# Patient Record
Sex: FEMALE | Race: WHITE | NOT HISPANIC OR LATINO
[De-identification: names, ages, dates, MRNs, and addresses within clinical notes are randomized per-mention and may not be internally consistent; named-entity substitution may affect disease eponyms.]

---

## 2019-07-12 PROBLEM — Z00.00 ENCOUNTER FOR PREVENTIVE HEALTH EXAMINATION: Status: ACTIVE | Noted: 2019-07-12

## 2019-08-02 ENCOUNTER — APPOINTMENT (OUTPATIENT)
Dept: NEUROSURGERY | Facility: CLINIC | Age: 54
End: 2019-08-02
Payer: COMMERCIAL

## 2019-08-02 VITALS
BODY MASS INDEX: 24.25 KG/M2 | WEIGHT: 160 LBS | DIASTOLIC BLOOD PRESSURE: 90 MMHG | HEIGHT: 68 IN | SYSTOLIC BLOOD PRESSURE: 146 MMHG

## 2019-08-02 DIAGNOSIS — M81.0 AGE-RELATED OSTEOPOROSIS W/OUT CURRENT PATHOLOGICAL FRACTURE: ICD-10-CM

## 2019-08-02 DIAGNOSIS — Z78.9 OTHER SPECIFIED HEALTH STATUS: ICD-10-CM

## 2019-08-02 DIAGNOSIS — R20.2 PARESTHESIA OF SKIN: ICD-10-CM

## 2019-08-02 PROCEDURE — 99204 OFFICE O/P NEW MOD 45 MIN: CPT

## 2019-08-02 RX ORDER — FLUOCINONIDE 1 MG/G
0.1 CREAM TOPICAL
Qty: 240 | Refills: 0 | Status: ACTIVE | COMMUNITY
Start: 2019-02-11

## 2019-08-02 RX ORDER — VALACYCLOVIR 500 MG/1
500 TABLET, FILM COATED ORAL
Qty: 7 | Refills: 0 | Status: ACTIVE | COMMUNITY
Start: 2019-04-24

## 2019-08-02 RX ORDER — SULFAMETHOXAZOLE AND TRIMETHOPRIM 800; 160 MG/1; MG/1
800-160 TABLET ORAL
Qty: 14 | Refills: 0 | Status: ACTIVE | COMMUNITY
Start: 2019-06-13

## 2019-08-02 RX ORDER — MIRABEGRON 25 MG/1
25 TABLET, FILM COATED, EXTENDED RELEASE ORAL
Qty: 30 | Refills: 0 | Status: ACTIVE | COMMUNITY
Start: 2019-06-06

## 2019-08-02 RX ORDER — DOXEPIN HYDROCHLORIDE 25 MG/1
25 CAPSULE ORAL
Qty: 60 | Refills: 0 | Status: ACTIVE | COMMUNITY
Start: 2019-03-06

## 2019-08-02 RX ORDER — FENOPROFEN CALCIUM 200 MG/1
200 CAPSULE ORAL
Qty: 90 | Refills: 0 | Status: ACTIVE | COMMUNITY
Start: 2019-02-11

## 2019-08-02 RX ORDER — OXYBUTYNIN CHLORIDE 10 MG/1
10 TABLET, EXTENDED RELEASE ORAL
Qty: 30 | Refills: 0 | Status: ACTIVE | COMMUNITY
Start: 2019-05-07

## 2019-08-02 RX ORDER — METOPROLOL SUCCINATE 50 MG/1
50 TABLET, EXTENDED RELEASE ORAL
Qty: 30 | Refills: 0 | Status: ACTIVE | COMMUNITY
Start: 2019-02-25

## 2019-08-02 RX ORDER — FLUCONAZOLE 150 MG/1
150 TABLET ORAL
Qty: 1 | Refills: 0 | Status: ACTIVE | COMMUNITY
Start: 2019-06-06

## 2019-08-02 RX ORDER — CEFDINIR 300 MG/1
300 CAPSULE ORAL
Qty: 14 | Refills: 0 | Status: ACTIVE | COMMUNITY
Start: 2019-07-15

## 2019-08-02 RX ORDER — LEVOTHYROXINE SODIUM 0.05 MG/1
50 TABLET ORAL
Qty: 30 | Refills: 0 | Status: ACTIVE | COMMUNITY
Start: 2019-04-08

## 2019-08-02 RX ORDER — SIMVASTATIN 20 MG/1
20 TABLET, FILM COATED ORAL
Qty: 90 | Refills: 0 | Status: ACTIVE | COMMUNITY
Start: 2019-04-24

## 2019-08-02 RX ORDER — HYDROXYZINE PAMOATE 25 MG/1
25 CAPSULE ORAL
Qty: 30 | Refills: 0 | Status: ACTIVE | COMMUNITY
Start: 2019-02-22

## 2019-08-02 RX ORDER — BUPROPION HYDROCHLORIDE 300 MG/1
300 TABLET, EXTENDED RELEASE ORAL
Qty: 30 | Refills: 0 | Status: ACTIVE | COMMUNITY
Start: 2019-02-12

## 2019-08-02 RX ORDER — BUPROPION HYDROCHLORIDE 150 MG/1
150 TABLET, EXTENDED RELEASE ORAL
Qty: 30 | Refills: 0 | Status: ACTIVE | COMMUNITY
Start: 2019-02-11

## 2019-08-02 RX ORDER — CHLORZOXAZONE 250 MG/1
250 TABLET ORAL
Qty: 120 | Refills: 0 | Status: ACTIVE | COMMUNITY
Start: 2019-02-11

## 2019-08-02 NOTE — PHYSICAL EXAM
[General Appearance - In No Acute Distress] : in no acute distress [General Appearance - Alert] : alert [General Appearance - Well Developed] : well developed [General Appearance - Well Nourished] : well nourished [] : normal voice and communication [General Appearance - Well-Appearing] : healthy appearing [Person] : oriented to person [Place] : oriented to place [Time] : oriented to time [Cranial Nerves Optic (II)] : visual acuity intact bilaterally,  pupils equal round and reactive to light [Cranial Nerves Oculomotor (III)] : extraocular motion intact [Cranial Nerves Facial (VII)] : face symmetrical [Cranial Nerves Vestibulocochlear (VIII)] : hearing was intact bilaterally [Cranial Nerves Trigeminal (V)] : facial sensation intact symmetrically [Cranial Nerves Accessory (XI - Cranial And Spinal)] : head turning and shoulder shrug symmetric [Cranial Nerves Glossopharyngeal (IX)] : tongue and palate midline [Motor Tone] : muscle tone was normal in all four extremities [Cranial Nerves Hypoglossal (XII)] : there was no tongue deviation with protrusion [Involuntary Movements] : no involuntary movements were seen [Motor Strength] : muscle strength was normal in all four extremities [Motor Handedness Right-Handed] : the patient is right hand dominant [No Muscle Atrophy] : normal bulk in all four extremities [Sensation Vibration Decrease] : vibration was intact [Sensation Tactile Decrease] : light touch was intact [Abnormal Walk] : normal gait [Proprioception] : proprioception was intact [Balance] : balance was intact [3+] : Brachioradialis left 3+ [Normal] : normal [Pain / Temp Decrease - Root / Radicular Distribution] : C8 sensory impairment [4] : 4/5 Elbow Extensor (C7) [5] : 5/5 Small Finger Abduction (T1) [3] : 3/4 Brachioradialis Reflex [FreeTextEntry1] : well appearing female [Soto] : Soto's sign was not demonstrated [FreeTextEntry6] : +trace weakness with triceps on the left

## 2019-08-02 NOTE — PLAN
[FreeTextEntry1] : DIAGNOSIS:  CERVICAL HERNIATED DISC, C3-4\par TREATMENT PLAN:  NON-SURGICAL\par \par This is a patient with a left paracentral disc herniation at C3-4 resulting in mild to moderate left foraminal stenosis. There is an anterior fusion at C4-5 and C5-6 with hardware intact. There is no evidence of hardware failure. There is no evidence of severe central canal stenosis or cord compression on MRI and she is neurologically intact. She has no symptoms of myelopathy. I have recommended nonsurgical management at this time. This consists of physical therapy and/or manual medicine in conjunction to medical therapy and other conservative methods.  These include the consideration of trigger point injections and or the utilization of modalities such as TENS where applicable.  The next tier would be the referral to a pain management specialist (anesthesia or physiatry) for the consideration of spinal injections.  This includes the options of epidural steroids, facet injections as well as other novel techniques that may provide pain relief.  Although there is indeed evidence of disk degeneration on imaging, discectomy or spinal fusion for the sole purposes of treating neck pain in the absence of a compressive lesion or neurological issue is associated with poor outcomes.   \par \par I have reviewed the images in detail with the patient today in my office and have answered all questions regarding this condition to the best of my ability to the patient’s satisfaction.

## 2019-08-02 NOTE — HISTORY OF PRESENT ILLNESS
[FreeTextEntry1] : Neck and left arm pain  [de-identified] : Mrs. Aguilar is a pleasant 53 year old female who presents to the office in neurosurgical consultation today with complaints of worsening neck and left arm pain for the past three months. She has a history of an anterior cervical decompression and fusion C4-6 by Dr. Elizabeth in 2013. She has been experiencing paresthesias radiating down the left arm and into her left hand, particularly to her left fifth finger. She has also been experiencing numbness to her right hand. She is right hand dominant and denies loss of  strength and dexterity. Currently she rates her pain as 3/10 which is exacerbated with keeping her head up. She has been taking Motrin/Tylenol with some relief. She recently saw Dr. Styles who had prescribed her a Medrol dosepak which seemed to alleviate some of her symptoms. She is also taking Flexeril as needed with moderate relief. She denies any recent injuries.

## 2019-08-02 NOTE — CONSULT LETTER
[Dear  ___] : Dear  [unfilled], [Consult Letter:] : I had the pleasure of evaluating your patient, [unfilled]. [Sincerely,] : Sincerely, [Consult Closing:] : Thank you very much for allowing me to participate in the care of this patient.  If you have any questions, please do not hesitate to contact me. [FreeTextEntry1] : Mrs. Aguilar is a pleasant 53 year old female who presents to the office in neurosurgical consultation today with complaints of worsening neck and left arm pain for the past three months. She has a history of an anterior cervical decompression and fusion C4-6 by Dr. Elizabeth in 2013. She has been experiencing paresthesias radiating down the left arm and into her left hand, particularly to her left fifth finger. She has also been experiencing numbness to her right hand. She is right hand dominant and denies loss of  strength and dexterity. Currently she rates her pain as 3/10 which is exacerbated with keeping her head up. She has been taking Motrin/Tylenol with some relief. She recently saw Dr. Styles who had prescribed her a Medrol dosepak which seemed to alleviate some of her symptoms. She is also taking Flexeril as needed with moderate relief. She denies any recent injuries. \par \par This is a pleasant 53 year old female who presents to the office today with complaints of neck pain and a radiculopathy. However her symptoms seem to be consistent with a C8 radiculopathy, but MRI does not reveal a significant disc herniation at C6-7 or C7-T1 that would correspond with her symptoms. At this point there is no role for any surgical intervention. I did recommend conservative management with antiinflammatories and, if she would like, PT. If her symptoms persist then she may need an EMG for further evaluation as it may be more of a peripheral neuropathy. \par \par DIAGNOSIS:  CERVICAL HERNIATED DISC, C3-4\par TREATMENT PLAN:  NON-SURGICAL\par \par This is a patient with a left paracentral disc herniation at C3-4 resulting in mild to moderate left foraminal stenosis. There is an anterior fusion at C4-5 and C5-6 with hardware intact. There is no evidence of hardware failure. There is no evidence of severe central canal stenosis or cord compression on MRI and she is neurologically intact. She has no symptoms of myelopathy. I have recommended nonsurgical management at this time. This consists of physical therapy and/or manual medicine in conjunction to medical therapy and other conservative methods.  These include the consideration of trigger point injections and or the utilization of modalities such as TENS where applicable.  The next tier would be the referral to a pain management specialist (anesthesia or physiatry) for the consideration of spinal injections.  This includes the options of epidural steroids, facet injections as well as other novel techniques that may provide pain relief.  Although there is indeed evidence of disk degeneration on imaging, discectomy or spinal fusion for the sole purposes of treating neck pain in the absence of a compressive lesion or neurological issue is associated with poor outcomes.   \par  [FreeTextEntry3] : Jaimie Hendrickson [email or web address]

## 2019-08-02 NOTE — PHYSICAL EXAM
[General Appearance - In No Acute Distress] : in no acute distress [General Appearance - Alert] : alert [General Appearance - Well Developed] : well developed [General Appearance - Well Nourished] : well nourished [General Appearance - Well-Appearing] : healthy appearing [] : normal voice and communication [Place] : oriented to place [Person] : oriented to person [Time] : oriented to time [Cranial Nerves Optic (II)] : visual acuity intact bilaterally,  pupils equal round and reactive to light [Cranial Nerves Oculomotor (III)] : extraocular motion intact [Cranial Nerves Facial (VII)] : face symmetrical [Cranial Nerves Trigeminal (V)] : facial sensation intact symmetrically [Cranial Nerves Vestibulocochlear (VIII)] : hearing was intact bilaterally [Cranial Nerves Accessory (XI - Cranial And Spinal)] : head turning and shoulder shrug symmetric [Cranial Nerves Glossopharyngeal (IX)] : tongue and palate midline [Motor Tone] : muscle tone was normal in all four extremities [Cranial Nerves Hypoglossal (XII)] : there was no tongue deviation with protrusion [Involuntary Movements] : no involuntary movements were seen [Motor Strength] : muscle strength was normal in all four extremities [Motor Handedness Right-Handed] : the patient is right hand dominant [No Muscle Atrophy] : normal bulk in all four extremities [Sensation Vibration Decrease] : vibration was intact [Sensation Tactile Decrease] : light touch was intact [Abnormal Walk] : normal gait [Proprioception] : proprioception was intact [Balance] : balance was intact [3+] : Brachioradialis left 3+ [Normal] : normal [Pain / Temp Decrease - Root / Radicular Distribution] : C8 sensory impairment [4] : 4/5 Elbow Extensor (C7) [5] : 5/5 Small Finger Abduction (T1) [3] : 3/4 Brachioradialis Reflex [FreeTextEntry1] : well appearing female [Soto] : Soto's sign was not demonstrated [FreeTextEntry6] : +trace weakness with triceps on the left

## 2019-08-02 NOTE — REASON FOR VISIT
[Spouse] : spouse [Referred By: _________] : Patient was referred by CRISTOBAL [New Patient Visit] : a new patient visit [FreeTextEntry1] : Neck and Left arm Pain, previous ACDF C4-6

## 2019-08-02 NOTE — RESULTS/DATA
[FreeTextEntry1] : MRI ([place] 5/2019) cervical spine was personally reviewed with the patients in the office today. MRI reveals anterior cervical hardware in place C4-5 and C5-6. There is a left paracentral disc herniation at C3-4 resulting in mild to moderate left foraminal stenosis. At C5-6 there is a small left paracentral disc bulge resulting in mild left foraminal stenosis. At C6-7 there is a small broad based disc bulge.

## 2019-08-02 NOTE — REASON FOR VISIT
[Spouse] : spouse [New Patient Visit] : a new patient visit [Referred By: _________] : Patient was referred by CRISTOBAL [FreeTextEntry1] : Neck and Left arm Pain, previous ACDF C4-6

## 2019-08-02 NOTE — REVIEW OF SYSTEMS
[As Noted in HPI] : as noted in HPI [Numbness] : numbness [Tingling] : tingling [Negative] : Constitutional

## 2019-08-02 NOTE — RESULTS/DATA
[FreeTextEntry1] : MRI ([place] 5/2019) cervical spine was personally reviewed with the patients in the office today. MRI reveals anterior cervical hardware in place C4-5 and C5-6. There is a left paracentral disc herniation at C3-4 resulting in mild to moderate left foraminal stenosis. At C5-6 there is a small left paracentral disc bulge resulting in mild left foraminal stenosis. At C6-7 there is a small broad based disc bulge.

## 2019-08-02 NOTE — HISTORY OF PRESENT ILLNESS
[FreeTextEntry1] : Neck and left arm pain  [de-identified] : Mrs. Aguilar is a pleasant 53 year old female who presents to the office in neurosurgical consultation today with complaints of worsening neck and left arm pain for the past three months. She has a history of an anterior cervical decompression and fusion C4-6 by Dr. Elizabeth in 2013. She has been experiencing paresthesias radiating down the left arm and into her left hand, particularly to her left fifth finger. She has also been experiencing numbness to her right hand. She is right hand dominant and denies loss of  strength and dexterity. Currently she rates her pain as 3/10 which is exacerbated with keeping her head up. She has been taking Motrin/Tylenol with some relief. She recently saw Dr. Styles who had prescribed her a Medrol dosepak which seemed to alleviate some of her symptoms. She is also taking Flexeril as needed with moderate relief. She denies any recent injuries.

## 2019-08-02 NOTE — ASSESSMENT
[FreeTextEntry1] : This is a pleasant 53 year old female who presents to the office today with complaints of neck pain and a radiculopathy. However her symptoms seem to be consistent with a C8 radiculopathy, but MRI does not reveal a significant disc herniation at C6-7 or C7-T1 that would correspond with her symptoms. At this point there is no role for any surgical intervention. I did recommend conservative management with antiinflammatories and, if she would like, PT. If her symptoms persist then she may need an EMG for further evaluation as it may be more of a peripheral neuropathy.

## 2021-03-30 ENCOUNTER — APPOINTMENT (OUTPATIENT)
Dept: NEUROSURGERY | Facility: CLINIC | Age: 56
End: 2021-03-30
Payer: COMMERCIAL

## 2021-03-30 VITALS
WEIGHT: 190 LBS | DIASTOLIC BLOOD PRESSURE: 86 MMHG | HEART RATE: 111 BPM | BODY MASS INDEX: 29.47 KG/M2 | HEIGHT: 67.5 IN | TEMPERATURE: 98.4 F | SYSTOLIC BLOOD PRESSURE: 140 MMHG

## 2021-03-30 DIAGNOSIS — F17.210 NICOTINE DEPENDENCE, CIGARETTES, UNCOMPLICATED: ICD-10-CM

## 2021-03-30 PROCEDURE — 99072 ADDL SUPL MATRL&STAF TM PHE: CPT

## 2021-03-30 PROCEDURE — 99213 OFFICE O/P EST LOW 20 MIN: CPT

## 2021-03-30 NOTE — REASON FOR VISIT
[Follow-Up: _____] : a [unfilled] follow-up visit [FreeTextEntry1] : Neck pain- Never continued conservative measures

## 2021-03-30 NOTE — PHYSICAL EXAM
[General Appearance - Alert] : alert [General Appearance - In No Acute Distress] : in no acute distress [General Appearance - Well Nourished] : well nourished [General Appearance - Well Developed] : well developed [General Appearance - Well-Appearing] : healthy appearing [] : normal voice and communication [Person] : oriented to person [Place] : oriented to place [Time] : oriented to time [Cranial Nerves Optic (II)] : visual acuity intact bilaterally,  pupils equal round and reactive to light [Cranial Nerves Oculomotor (III)] : extraocular motion intact [Cranial Nerves Trigeminal (V)] : facial sensation intact symmetrically [Cranial Nerves Facial (VII)] : face symmetrical [Cranial Nerves Vestibulocochlear (VIII)] : hearing was intact bilaterally [Cranial Nerves Glossopharyngeal (IX)] : tongue and palate midline [Cranial Nerves Accessory (XI - Cranial And Spinal)] : head turning and shoulder shrug symmetric [Cranial Nerves Hypoglossal (XII)] : there was no tongue deviation with protrusion [Motor Strength] : muscle strength was normal in all four extremities [Motor Tone] : muscle tone was normal in all four extremities [Involuntary Movements] : no involuntary movements were seen [No Muscle Atrophy] : normal bulk in all four extremities [Motor Handedness Right-Handed] : the patient is right hand dominant [Sensation Tactile Decrease] : light touch was intact [Sensation Vibration Decrease] : vibration was intact [Proprioception] : proprioception was intact [Abnormal Walk] : normal gait [Balance] : balance was intact [Normal] : normal [Pain / Temp Decrease - Root / Radicular Distribution] : C8 sensory impairment [4] : 4/5 Elbow Extensor (C7) [5] : 5/5 Small Finger Abduction (T1) [3] : 3/4 Brachioradialis Reflex [FreeTextEntry1] : well appearing female [Soto] : Soto's sign was not demonstrated [FreeTextEntry6] : +trace weakness with triceps on the left

## 2021-03-30 NOTE — ASSESSMENT
[FreeTextEntry1] : Discussed the history, physical examination findings,  previous surgery and recent imaging with the patient with all questions answered.  Because of the report of worsening upper extremity radicular symptoms, it is necessary to obtain updated imaging of the cervical spine.  An MRI of the cervical spine has been ordered today.  Recommend rest and  modified activity.  Medications as tolerated.  The patient will follow up after the imaging studies are completed to discuss further treatment recommendations.  Patient reports that she is not interested in another operation.  Discussed that we will most likely initiate conservative treatment and discuss all treatment options available.

## 2021-03-30 NOTE — DATA REVIEWED
[de-identified] : Were reviewed of the cervical spine -3/30/2021: Intact hardware with good anatomic alignment status post ACDF C4-6

## 2021-03-30 NOTE — HISTORY OF PRESENT ILLNESS
[FreeTextEntry1] : 55-year-old female presents to the neurosurgery office for follow-up evaluation.  The patient was last seen in 2019 for similar complaints.  The patient is known to the office that she is previously undergone an ACDF C4-6 by Dr. Elizabeth in 2013.  At the last visit, the patient reported neck pain and radiating left arm pain with some associated numbness and tingling.  Conservative treatment was recommended at that time.  The patient works as a  and has noticed worsening symptoms with clumsiness of the hands and she reports an unsteadiness in her gait.  No recent injury or trauma reported.

## 2021-04-06 ENCOUNTER — APPOINTMENT (OUTPATIENT)
Dept: NEUROSURGERY | Facility: CLINIC | Age: 56
End: 2021-04-06
Payer: COMMERCIAL

## 2021-04-06 VITALS
SYSTOLIC BLOOD PRESSURE: 139 MMHG | WEIGHT: 190 LBS | HEIGHT: 67.5 IN | HEART RATE: 87 BPM | DIASTOLIC BLOOD PRESSURE: 84 MMHG | TEMPERATURE: 98.1 F | BODY MASS INDEX: 29.47 KG/M2

## 2021-04-06 PROCEDURE — 99072 ADDL SUPL MATRL&STAF TM PHE: CPT

## 2021-04-06 PROCEDURE — 99213 OFFICE O/P EST LOW 20 MIN: CPT

## 2021-04-06 NOTE — PHYSICAL EXAM
[Motor Handedness Right-Handed] : the patient is right hand dominant [2+] : Triceps right 2+ [3+] : Triceps left 3+ [Normal] : normal [Able to toe walk] : the patient was able to toe walk [Able to heel walk] : the patient was able to heel walk [Intact] : all sensory within normal limits bilaterally [Soto] : Soto's sign was not demonstrated

## 2021-04-06 NOTE — RESULTS/DATA
[FreeTextEntry1] : \chung Cotter MRIs were reviewed. There is a 2 level cervical decompression fusion and is well fused seen on CAT scan 2019. A 2021 MRI shows the area of the discectomy was completely decompressed. The level above at C3-4 has some adjacent degeneration with stenosis. It is notable as the C1-C2 area does have significant stenosis as well. When compared to studies there is a clear abnormality DOC junction C1-C2. The narrowing may be somewhat more currently the most seen in the past. However there no axial cuts through this area appear

## 2021-04-06 NOTE — ASSESSMENT
[FreeTextEntry1] : \par As the patient is status post anterior cervical decompression fusion. The area of the cervical surgery was completely decompressed and fused. There is a small amount adjacent segment degeneration at C3-4 which potentially could be causing some of her symptomatology however more of concern is the OC and C1-C2 junction where there is more significant stenosis.\par \par I recommended a repeat MRI to look at this very specifically as it is not imaged properly on the routine cervical study and I recommended a CAT scan as well. His wound but to determine the nation whether or not she needs a simple anterior cervical discectomy and fusion C3-4 was something more complicated such as occipital cervical fusion or C1-C2 fusion.\par \par

## 2021-04-06 NOTE — REASON FOR VISIT
[Follow-Up: _____] : a [unfilled] follow-up visit [FreeTextEntry1] : \chung Allen is a 55-year-old here for evaluation of her cervical spine. I had taken care of her in the distant past she had a two-level anterior cervical discectomy with fusion C4-6. She can follow thereafter with CAT scans and MRI. In addition to her cervical stenosis she has a congenital anomaly of C1-C2. She is now complaining of increasing signs and symptoms that are suggestive of myelopathy. She has some imbalance clumsiness of her hands numbness and tingling. MRI studies were performed [place] recently. These are compared to studies done as far back as 2013.

## 2021-04-30 ENCOUNTER — APPOINTMENT (OUTPATIENT)
Dept: NEUROSURGERY | Facility: CLINIC | Age: 56
End: 2021-04-30
Payer: COMMERCIAL

## 2021-04-30 VITALS
HEIGHT: 67.5 IN | HEART RATE: 117 BPM | DIASTOLIC BLOOD PRESSURE: 70 MMHG | TEMPERATURE: 98.1 F | WEIGHT: 190 LBS | SYSTOLIC BLOOD PRESSURE: 130 MMHG | BODY MASS INDEX: 29.47 KG/M2

## 2021-04-30 DIAGNOSIS — M54.2 CERVICALGIA: ICD-10-CM

## 2021-04-30 PROCEDURE — 99072 ADDL SUPL MATRL&STAF TM PHE: CPT

## 2021-04-30 PROCEDURE — 99213 OFFICE O/P EST LOW 20 MIN: CPT

## 2021-04-30 NOTE — ASSESSMENT
[FreeTextEntry1] : \par Diagnosis:  s/p ACDF C4-7  mild ASD  C34 with C12 stenosis \par Treatment:  Nonsurgical\par \par This is a patient with cervical stenosis at C1-C2 due to a congenital anomaly.  Seems overall stable since imaging done in 2013.  She does have some balance issues and questions whether or not it is due to her spine or another doctor.  She states a history of vestibular issues.  Recommended the following: Neurology consultation, pain management consultation, and physical therapy with gait stabilization.  Not recommended any further surgery at this time.  Follow-up with her clinically on an as-needed basis.\par

## 2021-04-30 NOTE — REASON FOR VISIT
[Follow-Up: _____] : a [unfilled] follow-up visit [Spouse] : spouse [FreeTextEntry1] : Tiffany is here for a follow up with a new MRI that goes through C1/2\par She is still having some balance issues but is stable\par She has varied degrees of neck pain\par She has no evidence of overt myelopathy\par \par GONZALEZ Garcia\par PM  [place]\par \par \par

## 2021-04-30 NOTE — RESULTS/DATA
[FreeTextEntry1] : The full report is in the EMR  [facility] MRI was reviewed in detail.\par \par There is stenosis at C12 without myelomalacia. Visit congenital anomaly at C1-C2 with posterior osteophytic overgrowth buckling of the ligamentum flavum with some stenosis at this level.This is unchanged sicne 2013\par There is a C4-7 ACDF with mild ASD at C34\par

## 2021-10-19 ENCOUNTER — FORM ENCOUNTER (OUTPATIENT)
Age: 56
End: 2021-10-19

## 2021-10-20 ENCOUNTER — FORM ENCOUNTER (OUTPATIENT)
Age: 56
End: 2021-10-20

## 2021-10-21 ENCOUNTER — FORM ENCOUNTER (OUTPATIENT)
Age: 56
End: 2021-10-21

## 2021-11-03 ENCOUNTER — APPOINTMENT (OUTPATIENT)
Dept: INFECTIOUS DISEASE | Facility: CLINIC | Age: 56
End: 2021-11-03

## 2023-01-27 ENCOUNTER — APPOINTMENT (OUTPATIENT)
Dept: NEUROSURGERY | Facility: CLINIC | Age: 58
End: 2023-01-27
Payer: COMMERCIAL

## 2023-01-27 ENCOUNTER — APPOINTMENT (OUTPATIENT)
Dept: NEUROSURGERY | Facility: CLINIC | Age: 58
End: 2023-01-27

## 2023-01-27 PROCEDURE — 99441: CPT

## 2023-01-30 ENCOUNTER — APPOINTMENT (OUTPATIENT)
Dept: NEUROSURGERY | Facility: CLINIC | Age: 58
End: 2023-01-30
Payer: COMMERCIAL

## 2023-01-30 VITALS — WEIGHT: 190 LBS | TEMPERATURE: 98.1 F | BODY MASS INDEX: 29.82 KG/M2 | HEIGHT: 67 IN

## 2023-01-30 PROCEDURE — 99213 OFFICE O/P EST LOW 20 MIN: CPT

## 2023-01-30 NOTE — PHYSICAL EXAM
[Romberg's Sign] : Romberg's sign was negtive [Abnormal Walk] : normal gait [Balance] : balance was intact [FreeTextEntry6] : She has trace weakness of  in the left arm and hand she has normal deep tendon reflexes [FreeTextEntry7] : She has numbness and tingling down the entire left arm and hand.

## 2023-01-30 NOTE — ASSESSMENT
[FreeTextEntry1] : \par DIAGNOSIS    ADJACENT SEGMENT DEGENERATION C34  / PREVIOUS FUSION C4-6\par TREATMENT   NONSURGICAL VS.   EXPLORATION OF FUSION AND EXTENSION OF INSTRUMENTED FIXATION AND FUSION C34 \par \par This is a patient with adjacent segment degeneration to her previous cervical  fusion. At this point treatment options include bracing and nonsurgical methods. These include pain management techniques or the consideration of a spinal stimulator. There is moderately significant stenosis. The  option of absolute last resort would be to extend the fusion. This would entail exploring the previous surgical site assuring that the hardware was well seated and the previous fusion is robust. We would then proceed with  extending with the use of an interbody device  up to the affected levels.   \par \par I do not believe the C1-2 level requires treatment at this time and is stable.\par \par \par Risks and benefits of surgery were described to the patient in detail which include but not excluding those otherwise not mentioned:  coma paralysis, death, stroke, spinal fluid leak, nerve injury, weakness, infection, hardware malfunction/failure/mal-positioning requiring re-operation, vascular injury, nonunion/pseudoarthrosis, adjacent segment degeneration, dysphonia and dysphagia  or persistent pain.\par \par Because of her persistent symptomatology I recommended that she consider surgery upfront.  If her symptoms worsen I told her that it may become more of an urgent matter.

## 2023-01-30 NOTE — RESULTS/DATA
[FreeTextEntry1] : \par [facility] MRI was reviewed in detail showing that area of C3-4 adjacent segment degeneration with a disc osteophyte paracentral to the left.  Also noted was what I had noted previously at the C1-2 level some congenital stenosis that appears to be unchanged there is no evidence of additional stenosis or spinal cord signal change up at this level.
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
## 2023-02-08 ENCOUNTER — TRANSCRIPTION ENCOUNTER (OUTPATIENT)
Age: 58
End: 2023-02-08

## 2023-02-08 ENCOUNTER — APPOINTMENT (OUTPATIENT)
Dept: ORTHOPEDIC SURGERY | Facility: CLINIC | Age: 58
End: 2023-02-08
Payer: OTHER MISCELLANEOUS

## 2023-02-08 VITALS — WEIGHT: 190 LBS | BODY MASS INDEX: 29.82 KG/M2 | HEIGHT: 67 IN

## 2023-02-08 DIAGNOSIS — E78.00 PURE HYPERCHOLESTEROLEMIA, UNSPECIFIED: ICD-10-CM

## 2023-02-08 PROCEDURE — 99072 ADDL SUPL MATRL&STAF TM PHE: CPT

## 2023-02-08 PROCEDURE — 99204 OFFICE O/P NEW MOD 45 MIN: CPT

## 2023-02-08 NOTE — PHYSICAL EXAM
[4___] : left grasp 4[unfilled]/5 [Left] : left hand [de-identified] : Constitutional:\par - General Appearance:\par Unremarkable\par Body Habitus\par Well Developed\par Well Nourished\par Body Habitus\par No Deformities\par Well Groomed\par Ability To communicate:\par Normal\par Neurologic:\par Global sensation is intact to upper and lower extremities. See examination of Neck and/or Spine\par for exceptions.\par Orientation to Time, Place and Person is: Normal\par Mood And Affect is Normal\par Skin:\par - Head/Face, Right Upper/Lower Extremity, Left Upper/Lower Extremity: Normal\par See Examination of Neck and/or Spine for exceptions\par Cardiovascular:\par Peripheral Cardiovascular System is Normal\par Palpation of Lymph Nodes:\par Normal Palpation of lymph nodes in: Axilla, Cervical, Inguinal\par Abnormal Palpation of lymph nodes in: None  [] : negative facet loading [TWNoteComboBox7] : forward flexion 20 degrees [de-identified] : extension 0 degrees [de-identified] : left lateral rotation 25 degrees [TWNoteComboBox6] : right lateral rotation 45 degrees [de-identified] : shuffling gait  [de-identified] : 3+ b/l reflex

## 2023-02-08 NOTE — ASSESSMENT
[FreeTextEntry1] : 56 y/o female with cervical radiculopathy and myelopathy. Patient will obtain prior EMG results for next visit to review. I have indicated the patient for C3-4 ACDF. Risks, benefits and expected outcomes have been explained to the patient. Patient was understanding and in agreement. Patient remains temporarily and totally disabled from customary work at this time due to symptom severity. Patient will stay OOW. Note given. Patient will follow up in 1 month. \par \par Prior to appointment and during encounter with patient extensive medical records were reviewed including but not limited to, hospital records, outpatient records, imaging results, and lab data.During this appointment the patient was examined, diagnoses were discussed and explained in a face to face manner. In addition extensive time was spent reviewing aforementioned diagnostic studies. Counseling including abnormal image results, differential diagnoses, treatment options, risk and benefits, lifestyle changes, current condition, and current medications was performed. Patient's comments, questions, and concerns were addressed and patient verbalized understanding. Based on this patient's presentation at our office, which is an orthopedic spine surgeon's office, this patient inherently / intrinsically has a risk, however minute, of developing issues such as Cauda equina syndrome, bowel and bladder changes, or progression of motor or neurological deficits such as paralysis which may be permanent. \par \par I, Gloria Paul, attest that this documentation has been prepared under the direction and in the presence of provider Curtis Beltran MD.
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
## 2023-02-08 NOTE — DATA REVIEWED
[FreeTextEntry1] : On my interpretation of these images from [facility] on 11/23/22\par C2-3: normal \par C3-4: left sided disc herniation causing moderate central and moderate to severe foraminal stenosis with a mild left sided chord compression \par C4-5: S/p ACDF appears well decompressed \par C5-6: S/p ACDF appears well decompressed\par C6-7: normal\par C7-T1: normal \par T1-2: small disc herniation

## 2023-03-05 ENCOUNTER — FORM ENCOUNTER (OUTPATIENT)
Age: 58
End: 2023-03-05

## 2023-03-08 ENCOUNTER — APPOINTMENT (OUTPATIENT)
Dept: ORTHOPEDIC SURGERY | Facility: CLINIC | Age: 58
End: 2023-03-08
Payer: OTHER MISCELLANEOUS

## 2023-03-08 VITALS — BODY MASS INDEX: 29.82 KG/M2 | WEIGHT: 190 LBS | HEIGHT: 67 IN

## 2023-03-08 PROCEDURE — 99214 OFFICE O/P EST MOD 30 MIN: CPT

## 2023-03-08 PROCEDURE — 99072 ADDL SUPL MATRL&STAF TM PHE: CPT

## 2023-03-08 NOTE — DATA REVIEWED
[FreeTextEntry1] : EMG on 12/21/22 - left C4, C5, C6 cervical radiculopathy, no peripheral compression\par \par On my interpretation of these images from [facility] on 11/23/22\par C2-3: normal \par C3-4: left sided disc herniation causing moderate central and moderate to severe foraminal stenosis with a mild left sided chord compression \par C4-5: S/p ACDF appears well decompressed \par C5-6: S/p ACDF appears well decompressed\par C6-7: normal\par C7-T1: normal \par T1-2: small disc herniation
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
## 2023-03-08 NOTE — PHYSICAL EXAM
[4___] : left grasp 4[unfilled]/5 [Bilateral] : hand bilaterally [de-identified] : Constitutional:\par - General Appearance:\par Unremarkable\par Body Habitus\par Well Developed\par Well Nourished\par Body Habitus\par No Deformities\par Well Groomed\par Ability To communicate:\par Normal\par Neurologic:\par Global sensation is intact to upper and lower extremities. See examination of Neck and/or Spine\par for exceptions.\par Orientation to Time, Place and Person is: Normal\par Mood And Affect is Normal\par Skin:\par - Head/Face, Right Upper/Lower Extremity, Left Upper/Lower Extremity: Normal\par See Examination of Neck and/or Spine for exceptions\par Cardiovascular:\par Peripheral Cardiovascular System is Normal\par Palpation of Lymph Nodes:\par Normal Palpation of lymph nodes in: Axilla, Cervical, Inguinal\par Abnormal Palpation of lymph nodes in: None  [] : negative facet loading [de-identified] : pain with wrist flexors [TWNoteComboBox7] : forward flexion 20 degrees [de-identified] : extension 0 degrees [de-identified] : left lateral rotation 25 degrees [TWNoteComboBox6] : right lateral rotation 45 degrees [de-identified] : 3+ b/l reflex  [de-identified] : mild paresthesias non-radicular

## 2023-03-14 ENCOUNTER — APPOINTMENT (OUTPATIENT)
Dept: PAIN MANAGEMENT | Facility: CLINIC | Age: 58
End: 2023-03-14
Payer: OTHER MISCELLANEOUS

## 2023-03-14 VITALS — BODY MASS INDEX: 29.82 KG/M2 | HEIGHT: 67 IN | WEIGHT: 190 LBS

## 2023-03-14 PROCEDURE — 99072 ADDL SUPL MATRL&STAF TM PHE: CPT

## 2023-03-14 PROCEDURE — 99204 OFFICE O/P NEW MOD 45 MIN: CPT

## 2023-03-14 RX ORDER — METHYLPREDNISOLONE 4 MG/1
4 TABLET ORAL
Qty: 21 | Refills: 0 | Status: DISCONTINUED | COMMUNITY
Start: 2019-07-31 | End: 2023-03-14

## 2023-03-14 RX ORDER — CELECOXIB 200 MG/1
200 CAPSULE ORAL DAILY
Qty: 30 | Refills: 1 | Status: DISCONTINUED | COMMUNITY
Start: 2021-04-30 | End: 2023-03-14

## 2023-03-14 RX ORDER — CARISOPRODOL 250 MG/1
250 TABLET ORAL
Qty: 20 | Refills: 0 | Status: DISCONTINUED | COMMUNITY
Start: 2021-04-30 | End: 2023-03-14

## 2023-03-14 NOTE — PHYSICAL EXAM
[de-identified] : Constitutional:  \par - No acute distress  \par - Well developed; well nourished  \par \par Neurological:  \par - normal mood and affect  \par - alert and oriented x 3   \par \par Cardiovascular:  \par - grossly normal \par \par Cervical Spine Exam: \par \par Inspection:  \par erythema (-)  \par ecchymosis (-)  \par rashes (-)  \par \par Palpation:                                                   \par Cervical paraspinal tenderness:         R (-); L (-) \par Upper trapezius tenderness:              R (+); L (+) \par Rhomboids tenderness:                      R (-); L (-) \par Occipital Ridge:                                    R (-); L (-) \par Supraspinatus tenderness:                 R (-); L (-) \par \par ROM: Full ROM pain with extension and bilateral rotation\par \par Strength Testing:             \par Deltoid                           R (5/5); L (5/5) \par Biceps:                          R (5/5); L (5/5) \par Triceps:                         R (5/5); L (5/5) \par Finger Abductors:         R (5/5); L (5/5) \par Grasp:                           R (5/5); L (5/5) \par \par Special Testing: \par Spurling Test:                  R (-); L (+) \par Facet load test:               R (-); L (-) \par Soto Sign:                 R (+); L (+)\par \par Neuro: \par SILT throughout right upper extremity \par SILT throughout left upper extremity \par \par Reflexes: \par Biceps   -           R (3+); L (3+) \par Triceps  -           R (3+); L (3+) \par Brachioradialis- R (3+); L (3+)   \par Hyperreflexia in lower extremities bilaterally\par \par Positive Ankle clonus bilaterally
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
## 2023-03-14 NOTE — ASSESSMENT
[FreeTextEntry1] : A discussion regarding available pain management treatment options occurred with the patient.  These included interventional, rehabilitative, pharmacological, and alternative modalities. We will proceed with the following:  \par \par Interventional treatment options:  \par - Proceed with left PM C7-T1 CLIFFORD with fluoroscopic guidance\par - see additional instructions below  \par \par Rehabilitative options:  \par - completed PT trial\par - participation in active HEP was discussed  \par \par Medication based treatment options:\par - continue gabapentin 300 mg TID; further titration based upon clinical response\par - hydrocodone 5/300 up to BID PRN severe pain\par - see additional instructions below  \par \par Complementary treatment options:  \par - Weight management and lifestyle modifications discussed  \par - failed chiropractic care\par \par Additional treatment recommendations as follows:  \par - patient will follow up with Dr. Beltran as directed; patient awaiting authorization for ACDF\par - Follow up 1-2 weeks post injection for assessment of efficacy and further treatment recommendations\par - patient counseled extensively as to red flag signs and when to seek urgent care\par \par The risks, benefits and alternatives of the proposed procedure were explained in detail with the patient. The risks outlined include but are not limited to infection, bleeding, post- dural puncture headache, nerve injury, a temporary increase in pain, failure to resolve symptoms, allergic reaction, and possible elevation of blood sugar in diabetics if using corticosteroid.  All questions were answered to patient's apparent satisfaction and he/she verbalized an understanding.\par \par The documentation recorded by the scribe, in my presence, accurately reflects the service I personally performed and the decisions made by me with my edits as appropriate. \par \par I, Manjinder Esqueda acting as scribe, attest that this documentation has been prepared under the direction and in the presence of Provider Giacomo Hilliard DO.

## 2023-04-19 ENCOUNTER — APPOINTMENT (OUTPATIENT)
Dept: ORTHOPEDIC SURGERY | Facility: CLINIC | Age: 58
End: 2023-04-19
Payer: OTHER MISCELLANEOUS

## 2023-04-19 VITALS — BODY MASS INDEX: 29.82 KG/M2 | HEIGHT: 67 IN | WEIGHT: 190 LBS

## 2023-04-19 PROCEDURE — 99214 OFFICE O/P EST MOD 30 MIN: CPT
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
## 2023-04-24 NOTE — ASSESSMENT
[FreeTextEntry1] : 58 y/o female with cervical radiculopathy and myelopathy. In the meantime, while we are waiting for surgical authorization, I have referred her to cervical physical therapy for symptomatic control. I am prescribing the patient Gabapentin 300mg 2x/day, then after 4 days switch to 3x/day TID - titration schedule provided. Prescribed hydrocodone 5/300mg, PRN. I continue to believe that the patient is a candidate for C3-4 ACDF, especially in light of the development of early myelopathic symptoms with L hand dexterity intermittent loss. Risks, benefits and expected outcomes have been explained to the patient. Patient was understanding and in agreement. Patient remains temporarily and totally disabled from customary work at this time due to symptom severity. Patient will stay OOW. Note given. Patient will follow up in 6 weeks if surgery is not authorized. \par \par Prior to appointment and during encounter with patient extensive medical records were reviewed including but not limited to, hospital records, outpatient records, imaging results, and lab data.During this appointment the patient was examined, diagnoses were discussed and explained in a face to face manner. In addition extensive time was spent reviewing aforementioned diagnostic studies. Counseling including abnormal image results, differential diagnoses, treatment options, risk and benefits, lifestyle changes, current condition, and current medications was performed. Patient's comments, questions, and concerns were addressed and patient verbalized understanding. Based on this patient's presentation at our office, which is an orthopedic spine surgeon's office, this patient inherently / intrinsically has a risk, however minute, of developing issues such as Cauda equina syndrome, bowel and bladder changes, or progression of motor or neurological deficits such as paralysis which may be permanent. \par \par I, Gloria Paul, attest that this documentation has been prepared under the direction and in the presence of provider Curtis Beltran MD.

## 2023-04-24 NOTE — PHYSICAL EXAM
[4___] : left grasp 4[unfilled]/5 [Bilateral] : hand bilaterally [de-identified] : Constitutional:\par - General Appearance:\par Unremarkable\par Body Habitus\par Well Developed\par Well Nourished\par Body Habitus\par No Deformities\par Well Groomed\par Ability To communicate:\par Normal\par Neurologic:\par Global sensation is intact to upper and lower extremities. See examination of Neck and/or Spine\par for exceptions.\par Orientation to Time, Place and Person is: Normal\par Mood And Affect is Normal\par Skin:\par - Head/Face, Right Upper/Lower Extremity, Left Upper/Lower Extremity: Normal\par See Examination of Neck and/or Spine for exceptions\par Cardiovascular:\par Peripheral Cardiovascular System is Normal\par Palpation of Lymph Nodes:\par Normal Palpation of lymph nodes in: Axilla, Cervical, Inguinal\par Abnormal Palpation of lymph nodes in: None  [] : negative facet loading [de-identified] : pain with wrist flexors [TWNoteComboBox7] : forward flexion 20 degrees [de-identified] : extension 0 degrees [de-identified] : left lateral rotation 25 degrees [TWNoteComboBox6] : right lateral rotation 25 degrees [de-identified] : 3+ b/l reflex  [de-identified] : mild paresthesias non-radicular

## 2023-04-24 NOTE — DISCUSSION/SUMMARY
[de-identified] : 58 y/o female with cervical radiculopathy and early myelopathy from C3-4 disc herniation. Prior hx for C4-6 ACDF. In the setting of previous sine surgery, with a disc herniation of this side and early myelopathic symptoms the most appropriate solution is ACDF of C3-4. She has been through extensive PT and NSAIDs. PT will not improve this until post operation. She has been indicated for LEORA.. Although it may provide temporary relief, this is not likely a good long term solution and additionally, presents elevated risk as we are dealing with a situation of spinal cord compression and not simply radicular pain. I strongly encourage auth for C3-4 ACDF. She is unable to RTW as a  and has been OOW since November, I am sending a refill on Vicodin which she will use very sparingly as well as a MDP for recent shoulder pain on the RT. She will follow up with me in 6 weeks if surgery is not authorized.

## 2023-04-26 ENCOUNTER — APPOINTMENT (OUTPATIENT)
Dept: PAIN MANAGEMENT | Facility: CLINIC | Age: 58
End: 2023-04-26
Payer: OTHER MISCELLANEOUS

## 2023-04-26 PROCEDURE — 62321 NJX INTERLAMINAR CRV/THRC: CPT

## 2023-04-26 NOTE — PROCEDURE
[FreeTextEntry3] : Date of Service: 04/26/2023 \par \par Account: [id number]\par \par Patient: CLARIBEL MEYER \par \par YOB: 1965\par \par Age: 57 year\par \par Surgeon:      Giacomo Hilliard DO\par \par Assistant:    None\par \par Pre-Operative Diagnosis:         Cervical Radiculopathy (M54.12)\par \par Post Operative Diagnosis:       Cervical Radiculopathy (M54.12)\par \par Procedure:             Left paramedian (C7-T1) interlaminar epidural steroid injection under fluoroscopic guidance\par \par Anesthesia:  MAC\par \par This procedure was carried out using fluoroscopic guidance.  The risks and benefits of the procedure were discussed extensively with the patient.  The consent of the patient was obtained and the following procedure was performed.  A timeout was performed with all essential staff present and the site and side were verified.\par \par The patient was placed in the prone position and optimized to patient comfort.  The cervical area was prepped and draped in a sterile fashion.  The fluoroscope visualized the C7-T1 interspace using slight cephalad-caudad angulation and this area was marked.  Using sterile technique the superficial skin was anesthetized with 1% Lidocaine.  A 20 gauge 3.5 inch Tuohy needle was advanced under fluoroscopy until ligament was engaged.  Using a contralateral oblique view, a "loss of resistance" to air technique was utilized in order to gain access to the epidural space.  After negative aspiration for heme and CSF, 1 cc of Omnipaque contrast was administered and the appropriate cervical epidurogram was obtained in the MARLEEN and A/P view as well as digital subtraction angiography.\par \par A total injectate of 3 cc of preservative free normal saline and 40 mg of Kenalog was then injected into the epidural space while maintaining meaningful verbal contact with the patient.  \par \par Vital signs remained normal throughout the procedure.  The patient tolerated the procedure well.  There were no immediate complications from the performed procedure.  The patient was instructed to apply ice over the injection sites for twenty minutes every two hours for the next 24 hours.\par \par Disposition:\par      1. The patient was advised to F/U in 1-2 weeks to assess the response to the injection.\par      2. The patient was also instructed to contact me immediately if there were any concerns related to the procedure performed.

## 2023-05-02 ENCOUNTER — APPOINTMENT (OUTPATIENT)
Dept: PAIN MANAGEMENT | Facility: CLINIC | Age: 58
End: 2023-05-02
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
## 2023-05-11 ENCOUNTER — APPOINTMENT (OUTPATIENT)
Dept: PAIN MANAGEMENT | Facility: CLINIC | Age: 58
End: 2023-05-11
Payer: OTHER MISCELLANEOUS

## 2023-05-11 VITALS — HEIGHT: 67 IN | WEIGHT: 190 LBS | BODY MASS INDEX: 29.82 KG/M2

## 2023-05-11 PROCEDURE — 99213 OFFICE O/P EST LOW 20 MIN: CPT

## 2023-05-12 NOTE — PHYSICAL EXAM
[de-identified] : Constitutional:  \par - No acute distress  \par - Well developed; well nourished  \par \par Neurological:  \par - normal mood and affect  \par - alert and oriented x 3   \par \par Cardiovascular:  \par - grossly normal \par \par Cervical Spine Exam: \par \par Inspection:  \par erythema (-)  \par ecchymosis (-)  \par rashes (-) \par well-healed surgical scar anterior neck\par \par Palpation:                                                   \par Cervical paraspinal tenderness:         R (-); L (+) \par Upper trapezius tenderness:              R (+); L (+) \par Rhomboids tenderness:                      R (-); L (-) \par Occipital Ridge:                                    R (-); L (-) \par Supraspinatus tenderness:                 R (-); L (+) \par \par ROM: Diminished ROM all planes\par pain with extension and bilateral rotation\par \par Strength Testing:             \par Deltoid                           R (5/5); L (4+/5) \par Biceps:                          R (5/5); L (4+/5) \par Triceps:                         R (5/5); L (4+/5) \par Finger Abductors:         R (5/5); L (4+/5) \par Grasp:                           R (5/5); L (4+/5) \par \par Special Testing: \par Spurling Test:                  R (-); L (+) \par Facet load test:               R (-); L (-) \par Soto Sign:                 R (+); L (+)\par \par Neuro: \par SILT throughout right upper extremity \par SILT throughout left upper extremity \par \par Reflexes: \par Biceps   -           R (3+); L (3+) \par Triceps  -           R (3+); L (3+) \par Brachioradialis- R (3+); L (3+)   \par Hyperreflexia in lower extremities bilaterally\par \par Positive ankle clonus bilaterally

## 2023-05-12 NOTE — ASSESSMENT
[FreeTextEntry1] : A discussion regarding available pain management treatment options occurred with the patient.  These included interventional, rehabilitative, pharmacological, and alternative modalities. We will proceed with the following:  \par \par Interventional treatment options\par - Patient failed CLIFFORD\par - would not proceed further with interventional therapy given myelopathic symptoms\par - see additional instructions below  \par \par Rehabilitative options:  \par - Continue physical therapy as per orthopedics\par - participation in active HEP was discussed  \par \par Medication based treatment options:\par - continue gabapentin 300 mg TID; further titration based upon clinical response\par - hydrocodone 5/300 up to BID PRN severe pain\par - see additional instructions below  \par \par Complementary treatment options:  \par - Weight management and lifestyle modifications discussed  \par - failed chiropractic care\par \par Additional treatment recommendations as follows:  \par - patient will follow up with Dr. Beltran as directed; patient awaiting authorization for ACDF\par - Follow up on PRN basis\par - patient counseled extensively as to red flag signs and when to seek urgent care\par \par The R/B/A of chronic opiate therapy for non-malignant pain including, but not limited to, the risk of addiction, overdose, respiratory depression, and death was discussed and accepted by the patient.  Patient was also informed that they should not consume alcohol or drive a motor vehicle under any circumstances while taking opiate pain medications.\par \par The documentation recorded by the scribe, in my presence, accurately reflects the service I personally performed and the decisions made by me with my edits as appropriate. \par \par I, Manjinder Esqueda acting as scribe, attest that this documentation has been prepared under the direction and in the presence of Provider Giacomo Hilliard DO.
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
## 2023-05-24 ENCOUNTER — APPOINTMENT (OUTPATIENT)
Dept: ORTHOPEDIC SURGERY | Facility: CLINIC | Age: 58
End: 2023-05-24
Payer: OTHER MISCELLANEOUS

## 2023-05-24 VITALS — WEIGHT: 190 LBS | BODY MASS INDEX: 29.82 KG/M2 | HEIGHT: 67 IN

## 2023-05-24 PROCEDURE — 99214 OFFICE O/P EST MOD 30 MIN: CPT

## 2023-05-24 NOTE — PHYSICAL EXAM
[Bilateral] : hand bilaterally [de-identified] : Constitutional:\par - General Appearance:\par Unremarkable\par Body Habitus\par Well Developed\par Well Nourished\par Body Habitus\par No Deformities\par Well Groomed\par Ability To communicate:\par Normal\par Neurologic:\par Global sensation is intact to upper and lower extremities. See examination of Neck and/or Spine\par for exceptions.\par Orientation to Time, Place and Person is: Normal\par Mood And Affect is Normal\par Skin:\par - Head/Face, Right Upper/Lower Extremity, Left Upper/Lower Extremity: Normal\par See Examination of Neck and/or Spine for exceptions\par Cardiovascular:\par Peripheral Cardiovascular System is Normal\par Palpation of Lymph Nodes:\par Normal Palpation of lymph nodes in: Axilla, Cervical, Inguinal\par Abnormal Palpation of lymph nodes in: None  [FreeTextEntry8] : L>R diffuse UE paraesthesias. positive lhermitte's test. numbness and tingling b/l [TWNoteComboBox7] : forward flexion 20 degrees [de-identified] : extension 10 degrees [de-identified] : left lateral rotation 25 degrees [TWNoteComboBox6] : right lateral rotation 45 degrees [] : clonus not sustained at ankle [de-identified] : shuffling gait  [de-identified] : mild ataxia [de-identified] : 3+ b/l reflex
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
## 2023-05-24 NOTE — ASSESSMENT
[FreeTextEntry1] : 56 y/o female with cervical myelopathy. I continue to believe that the patient is a candidate for C3-4 ACDF, especially in light of the development of early myelopathic symptoms with L hand dexterity intermittent loss, progressive gait disturbance. Risks, benefits and expected outcomes have been explained to the patient. Patient was understanding and in agreement. Patient remains temporarily and totally disabled from customary work at this time due to symptom severity. Patient will stay OOW. Note given. Patient will follow up in 6-8 weeks. \par \par Prior to appointment and during encounter with patient extensive medical records were reviewed including but not limited to, hospital records, outpatient records, imaging results, and lab data.During this appointment the patient was examined, diagnoses were discussed and explained in a face to face manner. In addition extensive time was spent reviewing aforementioned diagnostic studies. Counseling including abnormal image results, differential diagnoses, treatment options, risk and benefits, lifestyle changes, current condition, and current medications was performed. Patient's comments, questions, and concerns were addressed and patient verbalized understanding. Based on this patient's presentation at our office, which is an orthopedic spine surgeon's office, this patient inherently / intrinsically has a risk, however minute, of developing issues such as Cauda equina syndrome, bowel and bladder changes, or progression of motor or neurological deficits such as paralysis which may be permanent. \par \par I, Milady Mckeon, attest that this documentation has been prepared under the direction and in the presence of provider Curtis Beltran MD.

## 2023-06-02 ENCOUNTER — APPOINTMENT (OUTPATIENT)
Dept: PAIN MANAGEMENT | Facility: CLINIC | Age: 58
End: 2023-06-02
Payer: OTHER MISCELLANEOUS

## 2023-06-02 ENCOUNTER — APPOINTMENT (OUTPATIENT)
Dept: ORTHOPEDIC SURGERY | Facility: CLINIC | Age: 58
End: 2023-06-02

## 2023-06-02 PROCEDURE — 99075 MEDICAL TESTIMONY: CPT

## 2023-07-05 ENCOUNTER — APPOINTMENT (OUTPATIENT)
Dept: ORTHOPEDIC SURGERY | Facility: CLINIC | Age: 58
End: 2023-07-05
Payer: OTHER MISCELLANEOUS

## 2023-07-05 VITALS — HEIGHT: 67 IN | BODY MASS INDEX: 29.82 KG/M2 | WEIGHT: 190 LBS

## 2023-07-05 PROCEDURE — 99214 OFFICE O/P EST MOD 30 MIN: CPT

## 2023-07-20 NOTE — PHYSICAL EXAM
[Flexion] : flexion [Extension] : extension [Rotation to left] : rotation to left [Rotation to right] : rotation to right [4___] : right deltoid  4[unfilled]/5 [de-identified] : Constitutional:\par - General Appearance:\par Unremarkable\par Body Habitus\par Well Developed\par Well Nourished\par Body Habitus\par No Deformities\par Well Groomed\par Ability To communicate:\par Normal\par Neurologic:\par Global sensation is intact to upper and lower extremities. See examination of Neck and/or Spine\par for exceptions.\par Orientation to Time, Place and Person is: Normal\par Mood And Affect is Normal\par Skin:\par - Head/Face, Right Upper/Lower Extremity, Left Upper/Lower Extremity: Normal\par See Examination of Neck and/or Spine for exceptions\par Cardiovascular:\par Peripheral Cardiovascular System is Normal\par Palpation of Lymph Nodes:\par Normal Palpation of lymph nodes in: Axilla, Cervical, Inguinal\par Abnormal Palpation of lymph nodes in: None  [FreeTextEntry3] : tremor in hands b/l  [FreeTextEntry8] : L>R diffuse UE paraesthesias. positive lhermitte's test. numbness and tingling b/l [TWNoteComboBox7] : forward flexion 20 degrees [de-identified] : extension 0 degrees [de-identified] : left lateral rotation 25 degrees [TWNoteComboBox6] : right lateral rotation 25 degrees [] : clonus not sustained at ankle [de-identified] : shuffling gait  [de-identified] : mild ataxia [de-identified] : 3+ b/l reflex
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
## 2023-07-20 NOTE — ASSESSMENT
[FreeTextEntry1] : 58 y/o female with cervical myelopathy. I continue to believe that the patient is a candidate for C3-4 ACDF, especially in light of the development of early myelopathic symptoms with L hand dexterity intermittent loss, progressive gait disturbance. Risks, benefits and expected outcomes have been explained to the patient. Patient was understanding and in agreement. I am prescribing the patient Vicodin to take sparingly. \par Patient remains temporarily and totally disabled from customary work at this time due to symptom severity. Patient will stay OOW. Note given. Patient will follow up in 6 weeks. \par \par Prior to appointment and during encounter with patient extensive medical records were reviewed including but not limited to, hospital records, outpatient records, imaging results, and lab data.During this appointment the patient was examined, diagnoses were discussed and explained in a face to face manner. In addition extensive time was spent reviewing aforementioned diagnostic studies. Counseling including abnormal image results, differential diagnoses, treatment options, risk and benefits, lifestyle changes, current condition, and current medications was performed. Patient's comments, questions, and concerns were addressed and patient verbalized understanding. Based on this patient's presentation at our office, which is an orthopedic spine surgeon's office, this patient inherently / intrinsically has a risk, however minute, of developing issues such as Cauda equina syndrome, bowel and bladder changes, or progression of motor or neurological deficits such as paralysis which may be permanent. \par \par I, Milady Mckeon, attest that this documentation has been prepared under the direction and in the presence of provider Curtis Beltran MD.

## 2023-08-11 ENCOUNTER — APPOINTMENT (OUTPATIENT)
Dept: ORTHOPEDIC SURGERY | Facility: CLINIC | Age: 58
End: 2023-08-11
Payer: OTHER MISCELLANEOUS

## 2023-08-11 VITALS — BODY MASS INDEX: 29.82 KG/M2 | HEIGHT: 67 IN | WEIGHT: 190 LBS

## 2023-08-11 PROCEDURE — 99214 OFFICE O/P EST MOD 30 MIN: CPT

## 2023-08-23 ENCOUNTER — APPOINTMENT (OUTPATIENT)
Dept: ORTHOPEDIC SURGERY | Facility: CLINIC | Age: 58
End: 2023-08-23
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
## 2023-08-25 NOTE — ASSESSMENT
[FreeTextEntry1] : 58 y/o female with severe cervical myelopathy. She has progressive loss of balance, increase in falls, progressive symptoms of pain, all consistent with cervical myelopathy. I have great concern especially in light of the falling, as she is having worsening symptoms, she should have a repeat cervical MRI as the most recent MRI is >9 months, to evaluate for interval worsening. I have indicated her for urgent surgery of C3-4 ACDF, I truly do not think this has the time to wait for a full court hearing and strongly encourage WC to authorize her surgery so that we can proceed and give her a better chance for neurologic improvements. I am increasing the patient's Gabapentin dosage to 600 mg TID. I am renewing the patient's Vicodin rx to take sparingly. Patient remains temporarily and totally disabled from customary work at this time due to symptom severity. Patient will stay OOW. Note given. Patient will follow up in 2-3 weeks to review MRI results.   Prior to appointment and during encounter with patient extensive medical records were reviewed including but not limited to, hospital records, outpatient records, imaging results, and lab data.During this appointment the patient was examined, diagnoses were discussed and explained in a face to face manner. In addition extensive time was spent reviewing aforementioned diagnostic studies. Counseling including abnormal image results, differential diagnoses, treatment options, risk and benefits, lifestyle changes, current condition, and current medications was performed. Patient's comments, questions, and concerns were addressed and patient verbalized understanding. Based on this patient's presentation at our office, which is an orthopedic spine surgeon's office, this patient inherently / intrinsically has a risk, however minute, of developing issues such as Cauda equina syndrome, bowel and bladder changes, or progression of motor or neurological deficits such as paralysis which may be permanent.   I, Milady Mckeon, attest that this documentation has been prepared under the direction and in the presence of provider Curtis Beltran MD.

## 2023-08-25 NOTE — PHYSICAL EXAM
[Flexion] : flexion [Extension] : extension [Rotation to left] : rotation to left [Rotation to right] : rotation to right [4___] : right deltoid  4[unfilled]/5 [de-identified] : Constitutional:\par  - General Appearance:\par  Unremarkable\par  Body Habitus\par  Well Developed\par  Well Nourished\par  Body Habitus\par  No Deformities\par  Well Groomed\par  Ability To communicate:\par  Normal\par  Neurologic:\par  Global sensation is intact to upper and lower extremities. See examination of Neck and/or Spine\par  for exceptions.\par  Orientation to Time, Place and Person is: Normal\par  Mood And Affect is Normal\par  Skin:\par  - Head/Face, Right Upper/Lower Extremity, Left Upper/Lower Extremity: Normal\par  See Examination of Neck and/or Spine for exceptions\par  Cardiovascular:\par  Peripheral Cardiovascular System is Normal\par  Palpation of Lymph Nodes:\par  Normal Palpation of lymph nodes in: Axilla, Cervical, Inguinal\par  Abnormal Palpation of lymph nodes in: None  [FreeTextEntry3] : tremor in hands b/l  [FreeTextEntry8] : L>R diffuse UE paraesthesias. positive lhermitte's test. numbness and tingling b/l [TWNoteComboBox7] : forward flexion 20 degrees [de-identified] : left lateral rotation 25 degrees [de-identified] : extension 0 degrees [TWNoteComboBox6] : right lateral rotation 25 degrees [] : clonus not sustained at ankle [de-identified] : shuffling gait  [de-identified] : mild ataxia [de-identified] : 3+ b/l reflex

## 2023-08-31 ENCOUNTER — RESULT REVIEW (OUTPATIENT)
Age: 58
End: 2023-08-31

## 2023-09-27 ENCOUNTER — APPOINTMENT (OUTPATIENT)
Dept: ORTHOPEDIC SURGERY | Facility: CLINIC | Age: 58
End: 2023-09-27
Payer: OTHER MISCELLANEOUS

## 2023-09-27 VITALS — HEIGHT: 67 IN | BODY MASS INDEX: 29.82 KG/M2 | WEIGHT: 190 LBS

## 2023-09-27 DIAGNOSIS — Z87.440 PERSONAL HISTORY OF URINARY (TRACT) INFECTIONS: ICD-10-CM

## 2023-09-27 PROCEDURE — 99214 OFFICE O/P EST MOD 30 MIN: CPT

## 2023-10-19 ENCOUNTER — OFFICE (OUTPATIENT)
Facility: LOCATION | Age: 58
Setting detail: OPHTHALMOLOGY
End: 2023-10-19
Payer: COMMERCIAL

## 2023-10-19 ENCOUNTER — RX ONLY (RX ONLY)
Age: 58
End: 2023-10-19

## 2023-10-19 DIAGNOSIS — H31.29: ICD-10-CM

## 2023-10-19 DIAGNOSIS — Z96.1: ICD-10-CM

## 2023-10-19 DIAGNOSIS — H40.1131: ICD-10-CM

## 2023-10-19 DIAGNOSIS — H43.393: ICD-10-CM

## 2023-10-19 DIAGNOSIS — H16.223: ICD-10-CM

## 2023-10-19 DIAGNOSIS — H35.443: ICD-10-CM

## 2023-10-19 DIAGNOSIS — H35.033: ICD-10-CM

## 2023-10-19 PROCEDURE — 99213 OFFICE O/P EST LOW 20 MIN: CPT | Performed by: OPHTHALMOLOGY

## 2023-10-19 PROCEDURE — 92133 CPTRZD OPH DX IMG PST SGM ON: CPT | Performed by: OPHTHALMOLOGY

## 2023-10-19 ASSESSMENT — SUPERFICIAL PUNCTATE KERATITIS (SPK)
OD_SPK: T 1+
OS_SPK: T 1+

## 2023-10-19 ASSESSMENT — TONOMETRY
OS_IOP_MMHG: 16
OD_IOP_MMHG: 16

## 2023-10-19 ASSESSMENT — CONFRONTATIONAL VISUAL FIELD TEST (CVF)
OD_FINDINGS: FULL
OS_FINDINGS: FULL

## 2023-10-20 PROBLEM — Z96.1 PSEUDOPHAKIA ; BOTH EYES: Status: ACTIVE | Noted: 2023-10-19

## 2023-10-20 ASSESSMENT — VISUAL ACUITY
OD_BCVA: 20/20
OS_BCVA: 20/25

## 2023-11-08 ENCOUNTER — APPOINTMENT (OUTPATIENT)
Dept: ORTHOPEDIC SURGERY | Facility: CLINIC | Age: 58
End: 2023-11-08
Payer: OTHER MISCELLANEOUS

## 2023-11-08 VITALS — BODY MASS INDEX: 29.82 KG/M2 | WEIGHT: 190 LBS | HEIGHT: 67 IN

## 2023-11-08 PROCEDURE — 99213 OFFICE O/P EST LOW 20 MIN: CPT

## 2023-12-20 ENCOUNTER — APPOINTMENT (OUTPATIENT)
Dept: ORTHOPEDIC SURGERY | Facility: CLINIC | Age: 58
End: 2023-12-20

## 2024-02-16 ENCOUNTER — OFFICE (OUTPATIENT)
Facility: LOCATION | Age: 59
Setting detail: OPHTHALMOLOGY
End: 2024-02-16
Payer: COMMERCIAL

## 2024-02-16 DIAGNOSIS — H35.033: ICD-10-CM

## 2024-02-16 DIAGNOSIS — H31.29: ICD-10-CM

## 2024-02-16 DIAGNOSIS — H35.443: ICD-10-CM

## 2024-02-16 DIAGNOSIS — H43.393: ICD-10-CM

## 2024-02-16 DIAGNOSIS — H40.1131: ICD-10-CM

## 2024-02-16 DIAGNOSIS — Z96.1: ICD-10-CM

## 2024-02-16 DIAGNOSIS — H16.223: ICD-10-CM

## 2024-02-16 PROCEDURE — 92083 EXTENDED VISUAL FIELD XM: CPT | Performed by: OPHTHALMOLOGY

## 2024-02-16 PROCEDURE — 92014 COMPRE OPH EXAM EST PT 1/>: CPT | Performed by: OPHTHALMOLOGY

## 2024-02-16 PROCEDURE — 92250 FUNDUS PHOTOGRAPHY W/I&R: CPT | Performed by: OPHTHALMOLOGY

## 2024-02-16 PROCEDURE — 92285 EXTERNAL OCULAR PHOTOGRAPHY: CPT | Performed by: OPHTHALMOLOGY

## 2024-02-16 ASSESSMENT — CONFRONTATIONAL VISUAL FIELD TEST (CVF)
OS_FINDINGS: FULL
OD_FINDINGS: FULL

## 2024-02-16 ASSESSMENT — SUPERFICIAL PUNCTATE KERATITIS (SPK)
OS_SPK: T 1+
OD_SPK: T 1+

## 2024-02-19 ASSESSMENT — REFRACTION_CURRENTRX
OS_SPHERE: +0.50
OD_OVR_VA: 20/
OD_SPHERE: PLANO
OD_CYLINDER: +0.50
OD_AXIS: 150
OS_CYLINDER: 0.00
OS_OVR_VA: 20/

## 2024-02-19 ASSESSMENT — REFRACTION_AUTOREFRACTION
OD_AXIS: 150
OD_CYLINDER: +0.50
OS_CYLINDER: 0.00
OS_SPHERE: +0.50
OD_SPHERE: 0.00

## 2024-02-19 ASSESSMENT — SPHEQUIV_DERIVED
OS_SPHEQUIV: 0.5
OD_SPHEQUIV: 0.25

## 2024-05-01 ENCOUNTER — APPOINTMENT (OUTPATIENT)
Dept: ORTHOPEDIC SURGERY | Facility: CLINIC | Age: 59
End: 2024-05-01
Payer: OTHER MISCELLANEOUS

## 2024-05-01 VITALS — HEIGHT: 67 IN | WEIGHT: 200 LBS | BODY MASS INDEX: 31.39 KG/M2

## 2024-05-01 DIAGNOSIS — M48.02 SPINAL STENOSIS, CERVICAL REGION: ICD-10-CM

## 2024-05-01 PROCEDURE — 99214 OFFICE O/P EST MOD 30 MIN: CPT

## 2024-05-01 RX ORDER — GABAPENTIN 300 MG/1
300 CAPSULE ORAL
Qty: 90 | Refills: 1 | Status: DISCONTINUED | COMMUNITY
Start: 2023-03-08 | End: 2024-05-01

## 2024-05-01 RX ORDER — PREGABALIN 75 MG/1
75 CAPSULE ORAL
Refills: 0 | Status: ACTIVE | COMMUNITY

## 2024-05-08 DIAGNOSIS — M50.20 OTHER CERVICAL DISC DISPLACEMENT, UNSPECIFIED CERVICAL REGION: ICD-10-CM

## 2024-05-08 DIAGNOSIS — M54.12 RADICULOPATHY, CERVICAL REGION: ICD-10-CM

## 2024-05-10 ENCOUNTER — OUTPATIENT (OUTPATIENT)
Dept: OUTPATIENT SERVICES | Facility: HOSPITAL | Age: 59
LOS: 1 days | End: 2024-05-10
Payer: COMMERCIAL

## 2024-05-10 VITALS
HEIGHT: 67 IN | WEIGHT: 199.08 LBS | DIASTOLIC BLOOD PRESSURE: 70 MMHG | OXYGEN SATURATION: 99 % | SYSTOLIC BLOOD PRESSURE: 110 MMHG | TEMPERATURE: 97 F | RESPIRATION RATE: 18 BRPM | HEART RATE: 103 BPM

## 2024-05-10 DIAGNOSIS — Z01.818 ENCOUNTER FOR OTHER PREPROCEDURAL EXAMINATION: ICD-10-CM

## 2024-05-10 DIAGNOSIS — J45.909 UNSPECIFIED ASTHMA, UNCOMPLICATED: ICD-10-CM

## 2024-05-10 DIAGNOSIS — Z29.9 ENCOUNTER FOR PROPHYLACTIC MEASURES, UNSPECIFIED: ICD-10-CM

## 2024-05-10 DIAGNOSIS — Z98.890 OTHER SPECIFIED POSTPROCEDURAL STATES: Chronic | ICD-10-CM

## 2024-05-10 DIAGNOSIS — Z86.79 PERSONAL HISTORY OF OTHER DISEASES OF THE CIRCULATORY SYSTEM: ICD-10-CM

## 2024-05-10 DIAGNOSIS — Z98.1 ARTHRODESIS STATUS: Chronic | ICD-10-CM

## 2024-05-10 DIAGNOSIS — M50.20 OTHER CERVICAL DISC DISPLACEMENT, UNSPECIFIED CERVICAL REGION: ICD-10-CM

## 2024-05-10 DIAGNOSIS — E03.9 HYPOTHYROIDISM, UNSPECIFIED: ICD-10-CM

## 2024-05-10 DIAGNOSIS — Z90.710 ACQUIRED ABSENCE OF BOTH CERVIX AND UTERUS: Chronic | ICD-10-CM

## 2024-05-10 DIAGNOSIS — Z90.89 ACQUIRED ABSENCE OF OTHER ORGANS: Chronic | ICD-10-CM

## 2024-05-10 DIAGNOSIS — M35.00 SJOGREN SYNDROME, UNSPECIFIED: ICD-10-CM

## 2024-05-10 DIAGNOSIS — N18.30 CHRONIC KIDNEY DISEASE, STAGE 3 UNSPECIFIED: ICD-10-CM

## 2024-05-10 LAB
A1C WITH ESTIMATED AVERAGE GLUCOSE RESULT: 6 % — HIGH (ref 4–5.6)
ALBUMIN SERPL ELPH-MCNC: 4.3 G/DL — SIGNIFICANT CHANGE UP (ref 3.3–5.2)
ALP SERPL-CCNC: 125 U/L — HIGH (ref 40–120)
ALT FLD-CCNC: 31 U/L — SIGNIFICANT CHANGE UP
ANION GAP SERPL CALC-SCNC: 13 MMOL/L — SIGNIFICANT CHANGE UP (ref 5–17)
APPEARANCE UR: CLEAR — SIGNIFICANT CHANGE UP
APTT BLD: 27.1 SEC — SIGNIFICANT CHANGE UP (ref 24.5–35.6)
AST SERPL-CCNC: 30 U/L — SIGNIFICANT CHANGE UP
BACTERIA # UR AUTO: NEGATIVE /HPF — SIGNIFICANT CHANGE UP
BASOPHILS # BLD AUTO: 0.04 K/UL — SIGNIFICANT CHANGE UP (ref 0–0.2)
BASOPHILS NFR BLD AUTO: 0.5 % — SIGNIFICANT CHANGE UP (ref 0–2)
BILIRUB SERPL-MCNC: 0.3 MG/DL — LOW (ref 0.4–2)
BILIRUB UR-MCNC: NEGATIVE — SIGNIFICANT CHANGE UP
BLD GP AB SCN SERPL QL: SIGNIFICANT CHANGE UP
BUN SERPL-MCNC: 18 MG/DL — SIGNIFICANT CHANGE UP (ref 8–20)
CALCIUM SERPL-MCNC: 9.5 MG/DL — SIGNIFICANT CHANGE UP (ref 8.4–10.5)
CAST: 1 /LPF — SIGNIFICANT CHANGE UP (ref 0–4)
CHLORIDE SERPL-SCNC: 99 MMOL/L — SIGNIFICANT CHANGE UP (ref 96–108)
CO2 SERPL-SCNC: 25 MMOL/L — SIGNIFICANT CHANGE UP (ref 22–29)
COLOR SPEC: ABNORMAL
CREAT SERPL-MCNC: 1.48 MG/DL — HIGH (ref 0.5–1.3)
DIFF PNL FLD: NEGATIVE — SIGNIFICANT CHANGE UP
EGFR: 41 ML/MIN/1.73M2 — LOW
EOSINOPHIL # BLD AUTO: 0.14 K/UL — SIGNIFICANT CHANGE UP (ref 0–0.5)
EOSINOPHIL NFR BLD AUTO: 1.9 % — SIGNIFICANT CHANGE UP (ref 0–6)
ESTIMATED AVERAGE GLUCOSE: 126 MG/DL — HIGH (ref 68–114)
GLUCOSE SERPL-MCNC: 107 MG/DL — HIGH (ref 70–99)
GLUCOSE UR QL: NEGATIVE MG/DL — SIGNIFICANT CHANGE UP
HCT VFR BLD CALC: 38.1 % — SIGNIFICANT CHANGE UP (ref 34.5–45)
HGB BLD-MCNC: 12.5 G/DL — SIGNIFICANT CHANGE UP (ref 11.5–15.5)
IMM GRANULOCYTES NFR BLD AUTO: 0.4 % — SIGNIFICANT CHANGE UP (ref 0–0.9)
INR BLD: 0.88 RATIO — SIGNIFICANT CHANGE UP (ref 0.85–1.18)
KETONES UR-MCNC: NEGATIVE MG/DL — SIGNIFICANT CHANGE UP
LEUKOCYTE ESTERASE UR-ACNC: NEGATIVE — SIGNIFICANT CHANGE UP
LYMPHOCYTES # BLD AUTO: 2 K/UL — SIGNIFICANT CHANGE UP (ref 1–3.3)
LYMPHOCYTES # BLD AUTO: 26.6 % — SIGNIFICANT CHANGE UP (ref 13–44)
MCHC RBC-ENTMCNC: 28.2 PG — SIGNIFICANT CHANGE UP (ref 27–34)
MCHC RBC-ENTMCNC: 32.8 GM/DL — SIGNIFICANT CHANGE UP (ref 32–36)
MCV RBC AUTO: 86 FL — SIGNIFICANT CHANGE UP (ref 80–100)
MONOCYTES # BLD AUTO: 0.64 K/UL — SIGNIFICANT CHANGE UP (ref 0–0.9)
MONOCYTES NFR BLD AUTO: 8.5 % — SIGNIFICANT CHANGE UP (ref 2–14)
MRSA PCR RESULT.: SIGNIFICANT CHANGE UP
NEUTROPHILS # BLD AUTO: 4.66 K/UL — SIGNIFICANT CHANGE UP (ref 1.8–7.4)
NEUTROPHILS NFR BLD AUTO: 62.1 % — SIGNIFICANT CHANGE UP (ref 43–77)
NITRITE UR-MCNC: NEGATIVE — SIGNIFICANT CHANGE UP
PH UR: 8.5 (ref 5–8)
PLATELET # BLD AUTO: 398 K/UL — SIGNIFICANT CHANGE UP (ref 150–400)
POTASSIUM SERPL-MCNC: 4.7 MMOL/L — SIGNIFICANT CHANGE UP (ref 3.5–5.3)
POTASSIUM SERPL-SCNC: 4.7 MMOL/L — SIGNIFICANT CHANGE UP (ref 3.5–5.3)
PROT SERPL-MCNC: 6.9 G/DL — SIGNIFICANT CHANGE UP (ref 6.6–8.7)
PROT UR-MCNC: NEGATIVE MG/DL — SIGNIFICANT CHANGE UP
PROTHROM AB SERPL-ACNC: 9.8 SEC — SIGNIFICANT CHANGE UP (ref 9.5–13)
RBC # BLD: 4.43 M/UL — SIGNIFICANT CHANGE UP (ref 3.8–5.2)
RBC # FLD: 15.3 % — HIGH (ref 10.3–14.5)
RBC CASTS # UR COMP ASSIST: 0 /HPF — SIGNIFICANT CHANGE UP (ref 0–4)
S AUREUS DNA NOSE QL NAA+PROBE: SIGNIFICANT CHANGE UP
SODIUM SERPL-SCNC: 137 MMOL/L — SIGNIFICANT CHANGE UP (ref 135–145)
SP GR SPEC: 1.01 — SIGNIFICANT CHANGE UP (ref 1–1.03)
SQUAMOUS # UR AUTO: 5 /HPF — SIGNIFICANT CHANGE UP (ref 0–5)
UROBILINOGEN FLD QL: 0.2 MG/DL — SIGNIFICANT CHANGE UP (ref 0.2–1)
WBC # BLD: 7.51 K/UL — SIGNIFICANT CHANGE UP (ref 3.8–10.5)
WBC # FLD AUTO: 7.51 K/UL — SIGNIFICANT CHANGE UP (ref 3.8–10.5)
WBC UR QL: 0 /HPF — SIGNIFICANT CHANGE UP (ref 0–5)

## 2024-05-10 PROCEDURE — 93010 ELECTROCARDIOGRAM REPORT: CPT

## 2024-05-10 PROCEDURE — 85025 COMPLETE CBC W/AUTO DIFF WBC: CPT

## 2024-05-10 PROCEDURE — 85610 PROTHROMBIN TIME: CPT

## 2024-05-10 PROCEDURE — 86901 BLOOD TYPING SEROLOGIC RH(D): CPT

## 2024-05-10 PROCEDURE — 87640 STAPH A DNA AMP PROBE: CPT

## 2024-05-10 PROCEDURE — G0480: CPT

## 2024-05-10 PROCEDURE — 87086 URINE CULTURE/COLONY COUNT: CPT

## 2024-05-10 PROCEDURE — 86900 BLOOD TYPING SEROLOGIC ABO: CPT

## 2024-05-10 PROCEDURE — 81001 URINALYSIS AUTO W/SCOPE: CPT

## 2024-05-10 PROCEDURE — 83036 HEMOGLOBIN GLYCOSYLATED A1C: CPT

## 2024-05-10 PROCEDURE — 86850 RBC ANTIBODY SCREEN: CPT

## 2024-05-10 PROCEDURE — G0463: CPT

## 2024-05-10 PROCEDURE — 85730 THROMBOPLASTIN TIME PARTIAL: CPT

## 2024-05-10 PROCEDURE — 87641 MR-STAPH DNA AMP PROBE: CPT

## 2024-05-10 PROCEDURE — 93005 ELECTROCARDIOGRAM TRACING: CPT

## 2024-05-10 PROCEDURE — 36415 COLL VENOUS BLD VENIPUNCTURE: CPT

## 2024-05-10 PROCEDURE — 80053 COMPREHEN METABOLIC PANEL: CPT
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
## 2024-05-10 NOTE — H&P PST ADULT - NSICDXPASTSURGICALHX_GEN_ALL_CORE_FT
PAST SURGICAL HISTORY:  S/P cervical spinal fusion     S/P hysterectomy     S/P right knee arthroscopy     S/P tonsillectomy

## 2024-05-10 NOTE — H&P PST ADULT - PROBLEM SELECTOR PLAN 1
Patient is scheduled for C3-C4 anterior cervical discectomy and fusion and instrumentation on 5/30/24 with Dr Beltran. Medical, cardiac, nephrology pending

## 2024-05-10 NOTE — H&P PST ADULT - NSICDXFAMILYHX_GEN_ALL_CORE_FT
FAMILY HISTORY:  Mother  Still living? Unknown  FH: lung cancer, Age at diagnosis: Age Unknown    Grandparent  Still living? Unknown  FHx: colon cancer, Age at diagnosis: Age Unknown

## 2024-05-12 LAB
CULTURE RESULTS: SIGNIFICANT CHANGE UP
SPECIMEN SOURCE: SIGNIFICANT CHANGE UP
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
## 2024-05-13 NOTE — DATA REVIEWED
[FreeTextEntry1] : I have independently reviewed and interpreted these images and reports. 2 V cervical XR on 1/22/24 from [facility]- C4-6.

## 2024-05-13 NOTE — DISCUSSION/SUMMARY
[de-identified] : 59 y/o female with C3-4-disc herniation with myelopathy and spinal cord compression, Patient continues to have discoordination in hands, frequent falls, hyperreflexia in LE, and paresthesia's, the only solution for this would be a surgical one level fusion, ACDF C3-4.   Patient remains temporarily and totally disabled from customary work at this time due to symptom severity. Patient will stay OOW. Note given.   Persistent and progression in neck pains and BL UE paresthesia's, most recent MRI is >6 months old, not appropriate to proceed with cervical sx without updated cervical MRI, I am requesting an updated cervical MRI for surgical planning.   I have indicated the patient for C3-4 ACDF. Risks, benefits and expected outcomes have been explained to the patient. Patient was understanding and in agreement.  The patient was advised of the diagnosis. The natural history of the pathology was explained in full to the patient. All questions were answered. The risks and benefits of surgical and non-surgical treatment were explained in full to the patient. The patient demonstrated a full understanding of the surgical and non-surgical options. The risks of surgery were outlined in full to the patient including but not limited to pain, stiffness, bleeding, scarring, infection, neurologic injury, vascular injury, failure to resolve symptoms, symptom recurrence, the need for further surgery, non-healing, wound breakdown, deep vein thrombosis, pulmonary embolism, anesthesia complications and even death. The patient understood all the risks and accepted them and understood that other complications could occur that are not mentioned above. The intraoperative plan, post-operative plan, post-operative expectations and limitations were explained in full. Importance of postoperative rehabilitation and restriction compliance was explained and emphasized. Expectations from non-surgical treatment were explained in full as well. The patient demonstrated a complete understanding of the treatment alternatives and wished to proceed with surgery. This will be scheduled accordingly. F/U in 2 weeks.   Prior to appointment and during encounter with patient extensive medical records were reviewed including but not limited to, hospital records, out patient records, imaging results, and lab data. During this appointment the patient was examined, diagnoses were discussed and explained in a face to face manner. In addition extensive time was spent reviewing aforementioned diagnostic studies. Counseling including abnormal image results, differential diagnoses, treatment options, risk and benefits, lifestyle changes, current condition, and current medications was performed. Patient's comments, questions, and concerns were address and patient verbalized understanding. Based on this patient's presentation at our office, which is an orthopedic spine surgeon's office, this patient inherently / intrinsically has a risk, however minute, of developing issues such as Cauda equina syndrome, bowel and bladder changes, or progression of motor or neurological deficits such as paralysis which may be permanent.    I, Milady Mckeon, attest that this documentation has been prepared under the direction and in the presence of provider Curtis Beltran MD.

## 2024-05-13 NOTE — PHYSICAL EXAM
[Flexion] : flexion [Extension] : extension [Rotation to left] : rotation to left [Rotation to right] : rotation to right [4___] : right deltoid  4[unfilled]/5 [de-identified] : Constitutional:\par  - General Appearance:\par  Unremarkable\par  Body Habitus\par  Well Developed\par  Well Nourished\par  Body Habitus\par  No Deformities\par  Well Groomed\par  Ability To communicate:\par  Normal\par  Neurologic:\par  Global sensation is intact to upper and lower extremities. See examination of Neck and/or Spine\par  for exceptions.\par  Orientation to Time, Place and Person is: Normal\par  Mood And Affect is Normal\par  Skin:\par  - Head/Face, Right Upper/Lower Extremity, Left Upper/Lower Extremity: Normal\par  See Examination of Neck and/or Spine for exceptions\par  Cardiovascular:\par  Peripheral Cardiovascular System is Normal\par  Palpation of Lymph Nodes:\par  Normal Palpation of lymph nodes in: Axilla, Cervical, Inguinal\par  Abnormal Palpation of lymph nodes in: None  [FreeTextEntry3] : tremor in hands b/l.  [TWNoteComboBox7] : forward flexion 20 degrees [de-identified] : extension 0 degrees [de-identified] : left lateral rotation 25 degrees [TWNoteComboBox6] : right lateral rotation 45 degrees [] : clonus not sustained at ankle [de-identified] : shuffling gait  [de-identified] : mild ataxia [de-identified] : 3+ b/l reflex. L4,L5,L6 paresthesia's on RT

## 2024-05-15 LAB
ANABASINE UR-MCNC: 5.9 NG/ML — HIGH
COTININE UR-MCNC: 835 NG/ML — HIGH
NICOTINE UR-MCNC: 642 NG/ML — HIGH
NORNICOTINE UR-MCNC: 29 NG/ML — HIGH

## 2024-05-16 NOTE — PROVIDER CONTACT NOTE (OTHER) - ACTION/TREATMENT ORDERED:
Emailed video recording of spine class to pt on 5/8, f/u with Telephone review of program, all questions answered. Contact information given.

## 2024-05-29 ENCOUNTER — TRANSCRIPTION ENCOUNTER (OUTPATIENT)
Age: 59
End: 2024-05-29

## 2024-05-29 NOTE — PATIENT PROFILE ADULT - STATED REASON FOR ADMISSION
Body Location Override (Optional - Billing Will Still Be Based On Selected Body Map Location If Applicable): Tip of Nose Detail Level: Simple Depth Of Biopsy: dermis Was A Bandage Applied: Yes Size Of Lesion In Cm: 0.4 X Size Of Lesion In Cm: 0 Biopsy Type: H and E Biopsy Method: Personna blade Anesthesia Type: 1% lidocaine with epinephrine Anesthesia Volume In Cc (Will Not Render If 0): 0.5 Hemostasis: Electrocautery Wound Care: Bacitracin Dressing: bandage Destruction After The Procedure: No Type Of Destruction Used: Cryotherapy Curettage Text: The wound bed was treated with curettage after the biopsy was performed. Cryotherapy Text: The wound bed was treated with cryotherapy after the biopsy was performed. Electrodesiccation Text: The wound bed was treated with electrodesiccation after the biopsy was performed. Electrodesiccation And Curettage Text: The wound bed was treated with electrodesiccation and curettage after the biopsy was performed. Silver Nitrate Text: The wound bed was treated with silver nitrate after the biopsy was performed. Lab: [id number] [facility] Lab Facility: [address] [place] [address] Path Notes (To The Dermatopathologist): Size: 0.4cm R/O: BCC vs SCC Consent: Written consent was obtained and risks were reviewed including but not limited to scarring, infection, bleeding, scabbing, incomplete removal, nerve damage and allergy to anesthesia. Post-Care Instructions: I reviewed with the patient in detail post-care instructions. Patient is to keep the biopsy site dry overnight, and then apply bacitracin twice daily until healed. Patient may apply hydrogen peroxide soaks to remove any crusting. Notification Instructions: Patient will be notified of biopsy results. However, patient instructed to call the office if not contacted within 2 weeks. Billing Type: [employer] Information: Selecting Yes will display possible errors in your note based on the variables you have selected. This validation is only offered as a suggestion for you. PLEASE NOTE THAT THE VALIDATION TEXT WILL BE REMOVED WHEN YOU FINALIZE YOUR NOTE. IF YOU WANT TO FAX A PRELIMINARY NOTE YOU WILL NEED TO TOGGLE THIS TO 'NO' IF YOU DO NOT WANT IT IN YOUR FAXED NOTE. spine suregery

## 2024-05-29 NOTE — PATIENT PROFILE ADULT - FALL HARM RISK - HARM RISK INTERVENTIONS

## 2024-05-30 ENCOUNTER — INPATIENT (INPATIENT)
Facility: HOSPITAL | Age: 59
LOS: 2 days | Discharge: HOME | DRG: 552 | End: 2024-06-02
Attending: ORTHOPAEDIC SURGERY | Admitting: ORTHOPAEDIC SURGERY
Payer: COMMERCIAL

## 2024-05-30 ENCOUNTER — TRANSCRIPTION ENCOUNTER (OUTPATIENT)
Age: 59
End: 2024-05-30

## 2024-05-30 ENCOUNTER — APPOINTMENT (OUTPATIENT)
Dept: ORTHOPEDIC SURGERY | Facility: HOSPITAL | Age: 59
End: 2024-05-30
Payer: OTHER MISCELLANEOUS

## 2024-05-30 VITALS
WEIGHT: 199.08 LBS | HEART RATE: 92 BPM | RESPIRATION RATE: 20 BRPM | TEMPERATURE: 97 F | DIASTOLIC BLOOD PRESSURE: 91 MMHG | SYSTOLIC BLOOD PRESSURE: 163 MMHG | OXYGEN SATURATION: 99 % | HEIGHT: 67 IN

## 2024-05-30 DIAGNOSIS — Z98.890 OTHER SPECIFIED POSTPROCEDURAL STATES: Chronic | ICD-10-CM

## 2024-05-30 DIAGNOSIS — Z90.89 ACQUIRED ABSENCE OF OTHER ORGANS: Chronic | ICD-10-CM

## 2024-05-30 DIAGNOSIS — Z90.710 ACQUIRED ABSENCE OF BOTH CERVIX AND UTERUS: Chronic | ICD-10-CM

## 2024-05-30 DIAGNOSIS — Z98.1 ARTHRODESIS STATUS: Chronic | ICD-10-CM

## 2024-05-30 DIAGNOSIS — M50.20 OTHER CERVICAL DISC DISPLACEMENT, UNSPECIFIED CERVICAL REGION: ICD-10-CM

## 2024-05-30 LAB — ABO RH CONFIRMATION: SIGNIFICANT CHANGE UP

## 2024-05-30 PROCEDURE — 22853 INSJ BIOMECHANICAL DEVICE: CPT | Mod: 80

## 2024-05-30 PROCEDURE — 22850 REMOVE SPINE FIXATION DEVICE: CPT | Mod: 62

## 2024-05-30 PROCEDURE — 22551 ARTHRD ANT NTRBDY CERVICAL: CPT | Mod: 62

## 2024-05-30 PROCEDURE — 20936 SP BONE AGRFT LOCAL ADD-ON: CPT

## 2024-05-30 PROCEDURE — 22853 INSJ BIOMECHANICAL DEVICE: CPT

## 2024-05-30 PROCEDURE — 99222 1ST HOSP IP/OBS MODERATE 55: CPT

## 2024-05-30 DEVICE — SURGIFLO MATRIX WITH THROMBIN KIT: Type: IMPLANTABLE DEVICE | Status: FUNCTIONAL

## 2024-05-30 DEVICE — IMPLANTABLE DEVICE: Type: IMPLANTABLE DEVICE | Status: FUNCTIONAL

## 2024-05-30 DEVICE — SURGIFOAM PAD 8CM X 12.5CM X 10MM (100): Type: IMPLANTABLE DEVICE | Status: FUNCTIONAL

## 2024-05-30 DEVICE — SURGICEL 2 X 14": Type: IMPLANTABLE DEVICE | Status: FUNCTIONAL

## 2024-05-30 RX ORDER — CEFAZOLIN SODIUM 1 G
2000 VIAL (EA) INJECTION EVERY 8 HOURS
Refills: 0 | Status: DISCONTINUED | OUTPATIENT
Start: 2024-05-30 | End: 2024-05-31

## 2024-05-30 RX ORDER — CELECOXIB 200 MG/1
400 CAPSULE ORAL ONCE
Refills: 0 | Status: COMPLETED | OUTPATIENT
Start: 2024-05-30 | End: 2024-05-30

## 2024-05-30 RX ORDER — DOXEPIN HCL 100 MG
200 CAPSULE ORAL AT BEDTIME
Refills: 0 | Status: DISCONTINUED | OUTPATIENT
Start: 2024-05-30 | End: 2024-06-02

## 2024-05-30 RX ORDER — APREPITANT 80 MG/1
40 CAPSULE ORAL ONCE
Refills: 0 | Status: COMPLETED | OUTPATIENT
Start: 2024-05-30 | End: 2024-05-30

## 2024-05-30 RX ORDER — BUPROPION HYDROCHLORIDE 150 MG/1
300 TABLET, EXTENDED RELEASE ORAL DAILY
Refills: 0 | Status: DISCONTINUED | OUTPATIENT
Start: 2024-05-30 | End: 2024-06-02

## 2024-05-30 RX ORDER — MAGNESIUM HYDROXIDE 400 MG/1
30 TABLET, CHEWABLE ORAL EVERY 12 HOURS
Refills: 0 | Status: DISCONTINUED | OUTPATIENT
Start: 2024-05-30 | End: 2024-06-02

## 2024-05-30 RX ORDER — METHOCARBAMOL 500 MG/1
750 TABLET, FILM COATED ORAL EVERY 8 HOURS
Refills: 0 | Status: DISCONTINUED | OUTPATIENT
Start: 2024-05-30 | End: 2024-05-31

## 2024-05-30 RX ORDER — ATORVASTATIN CALCIUM 80 MG/1
40 TABLET, FILM COATED ORAL AT BEDTIME
Refills: 0 | Status: DISCONTINUED | OUTPATIENT
Start: 2024-05-30 | End: 2024-06-02

## 2024-05-30 RX ORDER — METOPROLOL TARTRATE 50 MG
25 TABLET ORAL DAILY
Refills: 0 | Status: DISCONTINUED | OUTPATIENT
Start: 2024-05-30 | End: 2024-06-02

## 2024-05-30 RX ORDER — OXYCODONE HYDROCHLORIDE 5 MG/1
10 TABLET ORAL
Refills: 0 | Status: DISCONTINUED | OUTPATIENT
Start: 2024-05-30 | End: 2024-06-02

## 2024-05-30 RX ORDER — FENTANYL CITRATE 50 UG/ML
25 INJECTION INTRAVENOUS
Refills: 0 | Status: DISCONTINUED | OUTPATIENT
Start: 2024-05-30 | End: 2024-05-30

## 2024-05-30 RX ORDER — OXYCODONE HYDROCHLORIDE 5 MG/1
5 TABLET ORAL
Refills: 0 | Status: DISCONTINUED | OUTPATIENT
Start: 2024-05-30 | End: 2024-06-02

## 2024-05-30 RX ORDER — ONDANSETRON 8 MG/1
4 TABLET, FILM COATED ORAL EVERY 6 HOURS
Refills: 0 | Status: DISCONTINUED | OUTPATIENT
Start: 2024-05-30 | End: 2024-06-02

## 2024-05-30 RX ORDER — ASPIRIN/CALCIUM CARB/MAGNESIUM 324 MG
81 TABLET ORAL DAILY
Refills: 0 | Status: DISCONTINUED | OUTPATIENT
Start: 2024-05-30 | End: 2024-06-02

## 2024-05-30 RX ORDER — LEVOTHYROXINE SODIUM 125 MCG
50 TABLET ORAL DAILY
Refills: 0 | Status: DISCONTINUED | OUTPATIENT
Start: 2024-05-30 | End: 2024-06-02

## 2024-05-30 RX ORDER — ALBUTEROL 90 UG/1
2 AEROSOL, METERED ORAL EVERY 6 HOURS
Refills: 0 | Status: DISCONTINUED | OUTPATIENT
Start: 2024-05-30 | End: 2024-06-02

## 2024-05-30 RX ORDER — ACETAMINOPHEN 500 MG
1000 TABLET ORAL ONCE
Refills: 0 | Status: COMPLETED | OUTPATIENT
Start: 2024-05-30 | End: 2024-05-30

## 2024-05-30 RX ORDER — SODIUM CHLORIDE 9 MG/ML
3 INJECTION INTRAMUSCULAR; INTRAVENOUS; SUBCUTANEOUS EVERY 8 HOURS
Refills: 0 | Status: DISCONTINUED | OUTPATIENT
Start: 2024-05-30 | End: 2024-05-30

## 2024-05-30 RX ORDER — DOXEPIN HCL 100 MG
200 CAPSULE ORAL AT BEDTIME
Refills: 0 | Status: DISCONTINUED | OUTPATIENT
Start: 2024-05-30 | End: 2024-05-30

## 2024-05-30 RX ORDER — HYDROMORPHONE HYDROCHLORIDE 2 MG/ML
4 INJECTION INTRAMUSCULAR; INTRAVENOUS; SUBCUTANEOUS
Refills: 0 | Status: DISCONTINUED | OUTPATIENT
Start: 2024-05-30 | End: 2024-06-02

## 2024-05-30 RX ORDER — ACETAMINOPHEN 500 MG
975 TABLET ORAL EVERY 8 HOURS
Refills: 0 | Status: DISCONTINUED | OUTPATIENT
Start: 2024-05-30 | End: 2024-06-02

## 2024-05-30 RX ORDER — ACETAMINOPHEN 500 MG
975 TABLET ORAL ONCE
Refills: 0 | Status: COMPLETED | OUTPATIENT
Start: 2024-05-30 | End: 2024-05-30

## 2024-05-30 RX ORDER — BUDESONIDE AND FORMOTEROL FUMARATE DIHYDRATE 160; 4.5 UG/1; UG/1
2 AEROSOL RESPIRATORY (INHALATION)
Refills: 0 | Status: DISCONTINUED | OUTPATIENT
Start: 2024-05-30 | End: 2024-06-02

## 2024-05-30 RX ORDER — ONDANSETRON 8 MG/1
4 TABLET, FILM COATED ORAL ONCE
Refills: 0 | Status: DISCONTINUED | OUTPATIENT
Start: 2024-05-30 | End: 2024-05-30

## 2024-05-30 RX ORDER — CEVIMELINE 30 MG/1
30 CAPSULE ORAL
Refills: 0 | Status: DISCONTINUED | OUTPATIENT
Start: 2024-05-30 | End: 2024-06-02

## 2024-05-30 RX ORDER — POLYETHYLENE GLYCOL 3350 17 G/17G
17 POWDER, FOR SOLUTION ORAL DAILY
Refills: 0 | Status: DISCONTINUED | OUTPATIENT
Start: 2024-05-30 | End: 2024-06-02

## 2024-05-30 RX ORDER — PANTOPRAZOLE SODIUM 20 MG/1
40 TABLET, DELAYED RELEASE ORAL
Refills: 0 | Status: DISCONTINUED | OUTPATIENT
Start: 2024-05-30 | End: 2024-06-02

## 2024-05-30 RX ORDER — HYDROMORPHONE HYDROCHLORIDE 2 MG/ML
0.5 INJECTION INTRAMUSCULAR; INTRAVENOUS; SUBCUTANEOUS
Refills: 0 | Status: DISCONTINUED | OUTPATIENT
Start: 2024-05-30 | End: 2024-05-30

## 2024-05-30 RX ORDER — SODIUM CHLORIDE 9 MG/ML
1000 INJECTION INTRAMUSCULAR; INTRAVENOUS; SUBCUTANEOUS
Refills: 0 | Status: DISCONTINUED | OUTPATIENT
Start: 2024-05-30 | End: 2024-06-01

## 2024-05-30 RX ORDER — SENNA PLUS 8.6 MG/1
2 TABLET ORAL AT BEDTIME
Refills: 0 | Status: DISCONTINUED | OUTPATIENT
Start: 2024-05-30 | End: 2024-06-02

## 2024-05-30 RX ORDER — CEFAZOLIN SODIUM 1 G
2000 VIAL (EA) INJECTION ONCE
Refills: 0 | Status: DISCONTINUED | OUTPATIENT
Start: 2024-05-30 | End: 2024-05-30

## 2024-05-30 RX ORDER — TIOTROPIUM BROMIDE 18 UG/1
2 CAPSULE ORAL; RESPIRATORY (INHALATION) DAILY
Refills: 0 | Status: DISCONTINUED | OUTPATIENT
Start: 2024-05-30 | End: 2024-06-02

## 2024-05-30 RX ADMIN — HYDROMORPHONE HYDROCHLORIDE 0.5 MILLIGRAM(S): 2 INJECTION INTRAMUSCULAR; INTRAVENOUS; SUBCUTANEOUS at 10:15

## 2024-05-30 RX ADMIN — CEVIMELINE 30 MILLIGRAM(S): 30 CAPSULE ORAL at 21:10

## 2024-05-30 RX ADMIN — Medication 400 MILLIGRAM(S): at 12:00

## 2024-05-30 RX ADMIN — Medication 975 MILLIGRAM(S): at 06:15

## 2024-05-30 RX ADMIN — Medication 81 MILLIGRAM(S): at 17:16

## 2024-05-30 RX ADMIN — HYDROMORPHONE HYDROCHLORIDE 0.5 MILLIGRAM(S): 2 INJECTION INTRAMUSCULAR; INTRAVENOUS; SUBCUTANEOUS at 10:30

## 2024-05-30 RX ADMIN — SODIUM CHLORIDE 125 MILLILITER(S): 9 INJECTION INTRAMUSCULAR; INTRAVENOUS; SUBCUTANEOUS at 17:16

## 2024-05-30 RX ADMIN — SENNA PLUS 2 TABLET(S): 8.6 TABLET ORAL at 21:09

## 2024-05-30 RX ADMIN — Medication 975 MILLIGRAM(S): at 22:10

## 2024-05-30 RX ADMIN — Medication 200 MILLIGRAM(S): at 21:10

## 2024-05-30 RX ADMIN — BUDESONIDE AND FORMOTEROL FUMARATE DIHYDRATE 2 PUFF(S): 160; 4.5 AEROSOL RESPIRATORY (INHALATION) at 20:31

## 2024-05-30 RX ADMIN — Medication 1000 MILLIGRAM(S): at 15:08

## 2024-05-30 RX ADMIN — OXYCODONE HYDROCHLORIDE 10 MILLIGRAM(S): 5 TABLET ORAL at 18:16

## 2024-05-30 RX ADMIN — CELECOXIB 400 MILLIGRAM(S): 200 CAPSULE ORAL at 06:14

## 2024-05-30 RX ADMIN — Medication 75 MILLIGRAM(S): at 21:09

## 2024-05-30 RX ADMIN — OXYCODONE HYDROCHLORIDE 10 MILLIGRAM(S): 5 TABLET ORAL at 17:34

## 2024-05-30 RX ADMIN — SODIUM CHLORIDE 3 MILLILITER(S): 9 INJECTION INTRAMUSCULAR; INTRAVENOUS; SUBCUTANEOUS at 06:39

## 2024-05-30 RX ADMIN — HYDROMORPHONE HYDROCHLORIDE 0.5 MILLIGRAM(S): 2 INJECTION INTRAMUSCULAR; INTRAVENOUS; SUBCUTANEOUS at 10:05

## 2024-05-30 RX ADMIN — Medication 2000 MILLIGRAM(S): at 17:15

## 2024-05-30 RX ADMIN — Medication 975 MILLIGRAM(S): at 21:10

## 2024-05-30 RX ADMIN — APREPITANT 40 MILLIGRAM(S): 80 CAPSULE ORAL at 06:14

## 2024-05-30 RX ADMIN — OXYCODONE HYDROCHLORIDE 10 MILLIGRAM(S): 5 TABLET ORAL at 22:09

## 2024-05-30 RX ADMIN — HYDROMORPHONE HYDROCHLORIDE 0.5 MILLIGRAM(S): 2 INJECTION INTRAMUSCULAR; INTRAVENOUS; SUBCUTANEOUS at 09:55

## 2024-05-30 RX ADMIN — OXYCODONE HYDROCHLORIDE 10 MILLIGRAM(S): 5 TABLET ORAL at 21:09

## 2024-05-30 RX ADMIN — ATORVASTATIN CALCIUM 40 MILLIGRAM(S): 80 TABLET, FILM COATED ORAL at 21:10

## 2024-05-30 RX ADMIN — HYDROMORPHONE HYDROCHLORIDE 0.5 MILLIGRAM(S): 2 INJECTION INTRAMUSCULAR; INTRAVENOUS; SUBCUTANEOUS at 11:00

## 2024-05-30 NOTE — DISCHARGE NOTE PROVIDER - NSDCCPCAREPLAN_GEN_ALL_CORE_FT
PRINCIPAL DISCHARGE DIAGNOSIS  Diagnosis: Cervical disc displacement  Assessment and Plan of Treatment:

## 2024-05-30 NOTE — DISCHARGE NOTE PROVIDER - NSDCCPTREATMENT_GEN_ALL_CORE_FT
PRINCIPAL PROCEDURE  Procedure: Anterior cervical discectomy and fusion (ACDF)  Findings and Treatment:

## 2024-05-30 NOTE — DISCHARGE NOTE PROVIDER - CARE PROVIDER_API CALL
Curtis Beltran  Spine Surgery  [address] [place] [address]  Phone: [phone]  Fax: [phone]  Follow Up Time:    Curtis Beltran  Spine Surgery  [address] [place] [address]  Phone: [phone]  Fax: [phone]  Follow Up Time:     Ruben Gallardo  Urology  [address] [place] [address]  Phone: [phone]  Fax: [phone]  Follow Up Time:

## 2024-05-30 NOTE — CONSULT NOTE ADULT - NS ATTEND AMEND GEN_ALL_CORE FT
Medicare Annual Wellness Visit  Name: Renita Manual Date: 2021   MRN: <[id number]> Sex: Female   Age: 70 y.o. Ethnicity: Non- / Non    : 1950 Race: White (non-)      Broderick Roberson is here for Medicare AWV    Screenings for behavioral, psychosocial and functional/safety risks, and cognitive dysfunction are all negative except as indicated below. These results, as well as other patient data from the [address] form, are documented in Flowsheets linked to this Encounter. Allergies   Allergen Reactions    Sulfa Antibiotics Rash    Keflex [Cephalexin] Diarrhea       Prior to Visit Medications    Medication Sig Taking?  Authorizing Provider   alendronate (FOSAMAX) 35 MG tablet TAKE 1 TABLET EVERY 7 DAYS Yes Dex Jenkins MD   pravastatin (PRAVACHOL) 20 MG tablet TAKE 1 TABLET BY MOUTH EVERY DAY IN THE EVENING Yes TITI Mitchell CNP   levothyroxine (SYNTHROID) 25 MCG tablet TAKE 1 TABLET BY MOUTH EVERY DAY Yes BASSEM [place]   amLODIPine (NORVASC) 2.5 MG tablet TAKE 1 TABLET BY MOUTH EVERY DAY Yes BASSEM [place]   Cholecalciferol (VITAMIN D3) 1000 units CAPS Take 1,000 Units by mouth daily Yes Historical Provider, MD   Esomeprazole Magnesium (NEXIUM PO) Take by mouth as needed Yes Historical Provider, MD   Calcium Carbonate Antacid (TUMS E-X 750 PO) Take 750 mg by mouth daily Yes Historical Provider, MD       Past Medical History:   Diagnosis Date    Acquired hypothyroidism 2020    CA rectum[facility] adenoca [facility]     Cancer ([facility] [address] [facility]     BASAL CELL ARM AND LEG, Dr. Yosvany Amin    GERD (gastroesophageal reflux disease)     Hypertension     Pure hypercholesterolemia 2017       Past Surgical History:   Procedure Laterality Date    COLONOSCOPY  2007    NORMAL    DILATATION, ESOPHAGUS      HEMORRHOID SURGERY  2005    HYSTERECTOMY      OVARY REMOVAL      SKIN BIOPSY      SKIN CANCER EXCISION Right 2007    LEG AND ARM       Family History   Problem Relation Age of Onset    Cancer Mother 80        breast    Breast Cancer Mother 80    Cancer Sister         Breast    Breast Cancer Sister        CareTeam (Including outside providers/suppliers regularly involved in providing care):   Patient Care Team:  Danitza Sevilla MD as PCP - General (Internal Medicine)  Danitza Sevilla MD as PCP - [facility]  Gabby Rendon MD as Surgeon (Colon and Rectal Surgery)  April Hewitt MD as Consulting Physician (Hematology and Oncology)    Wt Readings from Last 3 Encounters:   12/27/21 156 lb 12.8 oz (71.1 kg)   03/15/21 164 lb (74.4 kg)   08/25/20 163 lb (73.9 kg)     Vitals:    12/27/21 1253   BP: 132/78   Site: Right Upper Arm   Position: Sitting   Cuff Size: Medium Adult   Pulse: 78   SpO2: 99%   Weight: 156 lb 12.8 oz (71.1 kg)   Height: 5' 3\" (1.6 m)     Body mass index is 27.78 kg/m². Based upon direct observation of the patient, evaluation of cognition reveals recent and remote memory intact.     General Appearance: alert and oriented to person, place and time, well developed and well- nourished, in no acute distress  Skin: warm and dry, no rash or erythema  Head: normocephalic and atraumatic  Eyes: pupils equal, round, and reactive to light, extraocular eye movements intact, conjunctivae normal  ENT: tympanic membrane, external ear and ear canal normal bilaterally, nose without deformity, nasal mucosa and turbinates normal without polyps  Neck: supple and non-tender without mass, no thyromegaly or thyroid nodules, no cervical lymphadenopathy  Pulmonary/Chest: clear to auscultation bilaterally- no wheezes, rales or rhonchi, normal air movement, no respiratory distress  Cardiovascular: normal rate, regular rhythm, normal S1 and S2, no murmurs, rubs, clicks, or gallops, distal pulses intact, no carotid bruits  Abdomen: soft, non-tender, non-distended, normal bowel sounds, no masses or organomegaly  Extremities: no cyanosis, clubbing or edema  Musculoskeletal: normal range of motion, no joint swelling, deformity or tenderness  Neurologic: reflexes normal and symmetric, no cranial nerve deficit, gait, coordination and speech normal    Patient's complete Health Risk Assessment and screening values have been reviewed and are found in Flowsheets. The following problems were reviewed today and where indicated follow up appointments were made and/or referrals ordered.       Positive Risk Factor Screenings with Interventions:             Health Habits/Nutrition:  Health Habits/Nutrition  Do you exercise for at least 20 minutes 2-3 times per week?: (!) No  Have you lost any weight without trying in the past 3 months?: No  Do you eat only one meal per day?: No  Have you seen the dentist within the past year?: (!) No  Body mass index: (!) 27.77  Health Habits/Nutrition Interventions:  · Inadequate physical activity:  patient is not ready to increase his/her physical activity level at this time  · Dental exam overdue:  patient encouraged to make appointment with his/her dentist         Personalized Preventive Plan   Current Health Maintenance Status  Immunization History   Administered Date(s) Administered    PARISHID-19, Mare Roberson, Primary or Immunocompromised, PF, 100mcg/0.5mL 02/25/2021, 03/25/2021    Influenza Vaccine, unspecified formulation 09/26/2016, 09/16/2017    Influenza Virus Vaccine 10/07/2015, 09/16/2017    Influenza, High Dose (Fluzone 65 yrs and older) 10/14/2015, 09/26/2016, 10/04/2017, 10/03/2018, 10/04/2019    Influenza, High-dose, Gloria Pleva, 65 yrs +, IM (Fluzone) 10/11/2021    Influenza, Quadv, adjuvanted, 65 yrs +, IM, PF (Fluad) 09/04/2020    Pneumococcal Conjugate 13-valent (Snlnewv95) 03/25/2015    Pneumococcal Polysaccharide (Puayfnseq20) 07/18/2016    Tdap (Boostrix, Adacel) 01/01/2009    Unknown Immunization 09/04/2020        Health Maintenance   Topic Date Due    Shingles Vaccine (1 of 2) Never done    DTaP/Tdap/Td vaccine (2 - Td or Tdap) 01/01/2019    Annual Wellness Visit (AWV)  08/02/2020    Lipid screen  08/25/2021    Potassium monitoring  08/25/2021    Creatinine monitoring  08/25/2021    COVID-19 Vaccine (3 - Booster for Moderna series) 09/25/2021    TSH testing  11/04/2021    Breast cancer screen  07/15/2022    Colon cancer screen colonoscopy  11/14/2026    DEXA (modify frequency per FRAX score)  Completed    Flu vaccine  Completed    Pneumococcal 65+ years Vaccine  Completed    Hepatitis C screen  Completed    Hepatitis A vaccine  Aged Out    Hepatitis B vaccine  Aged Out    Hib vaccine  Aged Out    Meningococcal (ACWY) vaccine  Aged Out     Recommendations for [place] Due: see orders and patient instructions/AVS.  . Recommended screening schedule for the next 5-10 years is provided to the patient in written form: see Patient Sarai Yanez was seen today for medicare awv. Diagnoses and all orders for this visit:    Routine general medical examination at a health care facility    Essential hypertension  -     Renal Function Panel; Future    Hypercholesterolemia  -     LIPID PANEL; Future    Acquired hypothyroidism  -     TSH with Reflex;  Future Patient is see and evaluated, chart reviewed in detail, agree with assessment and plan of the NP  patient's pain is well controlled, has no complains  CTA b/l   anterior neck area is covered with clean dressing, no bleeding or soaking  Will continue with current plan of care  IV fluids.  CKD, will monitor BMP

## 2024-05-30 NOTE — PHYSICAL THERAPY INITIAL EVALUATION ADULT - NSPTDISCHREC_GEN_A_CORE
home with assist prn; outpatient PT follow up when deemed medically appropriate to participate by Ortho MD.
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
## 2024-05-30 NOTE — PHYSICAL THERAPY INITIAL EVALUATION ADULT - GENERAL OBSERVATIONS, REHAB EVAL
Pt received in bed + IV +  + hemovac drain,  present, breathing on RA in NAD, in 3/10 neck pain, agreeable to PT evaluation

## 2024-05-30 NOTE — DISCHARGE NOTE PROVIDER - PROVIDER TOKENS
PROVIDER:[TOKEN:[[id number]]] PROVIDER:[TOKEN:[[id number]]],PROVIDER:[TOKEN:[[id number]]]

## 2024-05-30 NOTE — DISCHARGE NOTE PROVIDER - HOSPITAL COURSE
Patient was admitted for elective anterior cervical discectomy and fusion on 5/30/24. The post-operative course was uneventful.  PT/OT worked with patient for acute rehabilitation process. The pain was adequately controlled and patient is able to go home as she can accomplish ADL with help from family member at this time. Patient was admitted for elective anterior cervical discectomy and fusion on 5/30/24. The patient developed urinary retention post operatively and was treated with flomax.  PT/OT worked with patient for acute rehabilitation process. The pain was adequately controlled and patient is able to go home as she can accomplish ADL with help from family member at this time.

## 2024-05-30 NOTE — DISCHARGE NOTE PROVIDER - NSDCFUSCHEDAPPT_GEN_ALL_CORE_FT
Curtis Beltran  [facility] Physician [facility]  [place] [address] [facility]  Scheduled Appointment: 06/12/2024

## 2024-05-30 NOTE — DISCHARGE NOTE PROVIDER - NSDCMRMEDTOKEN_GEN_ALL_CORE_FT
Albuterol (Eqv-ProAir HFA) 90 mcg/inh inhalation aerosol: 2 puff(s) inhaled  aspirin 81 mg oral tablet: orally once a day  atorvastatin 40 mg oral tablet: 1 tab(s) orally once a day  buPROPion 300 mg/24 hours (XL) oral tablet, extended release: 1 tab(s) orally once a day (at bedtime)  cevimeline 30 mg oral capsule: 1 cap(s) orally 2 times a day  doxepin 100 mg oral capsule: 1 cap(s) orally 2 times a day  Flexeril 10 mg oral tablet: 1 tab(s) orally 2 times a day  levothyroxine 50 mcg (0.05 mg) oral tablet: 1 tab(s) orally once a day  Lyrica 75 mg oral capsule: 1 cap(s) orally once a day (at bedtime)  metoprolol succinate 25 mg oral capsule, extended release: 1 cap(s) orally once a day  omeprazole 40 mg oral delayed release capsule: 1 cap(s) orally once a day  Trelegy Ellipta 200 mcg-62.5 mcg-25 mcg/inh inhalation powder: 1 puff(s) inhaled once a day   Albuterol (Eqv-ProAir HFA) 90 mcg/inh inhalation aerosol: 2 puff(s) inhaled 2 times a day as needed for  shortness of breath and/or wheezing  aspirin 81 mg oral tablet: orally once a day  atorvastatin 40 mg oral tablet: 1 tab(s) orally once a day  buPROPion 300 mg/24 hours (XL) oral tablet, extended release: 1 tab(s) orally once a day (at bedtime)  cevimeline 30 mg oral capsule: 1 cap(s) orally 2 times a day  diazePAM 2 mg oral tablet: 1 tab(s) orally 2 times a day as needed for muscle spasm MDD: 2  doxepin 100 mg oral capsule: 1 cap(s) orally 2 times a day  levothyroxine 50 mcg (0.05 mg) oral tablet: 1 tab(s) orally once a day  Lyrica 75 mg oral capsule: 1 cap(s) orally once a day (at bedtime)  Medrol Dosepak 4 mg oral tablet: 4 milligram(s) orally please take as directed  metoprolol succinate 25 mg oral capsule, extended release: 1 cap(s) orally once a day  Narcan 4 mg/0.1 mL nasal spray: 4 milligram(s) intranasally once as needed for narcotic overdose  omeprazole 40 mg oral delayed release capsule: 1 cap(s) orally once a day  oxyCODONE 5 mg oral tablet: 1 tab(s) orally every 6 hours as needed for  mild pain MDD: 4  Senna S 50 mg-8.6 mg oral tablet: 2 tab(s) orally once a day (at bedtime)  Trelegy Ellipta 200 mcg-62.5 mcg-25 mcg/inh inhalation powder: 1 puff(s) inhaled once a day  Tylenol 325 mg oral tablet: 3 tab(s) orally every 8 hours as needed for  mild pain   Albuterol (Eqv-ProAir HFA) 90 mcg/inh inhalation aerosol: 2 puff(s) inhaled 2 times a day as needed for  shortness of breath and/or wheezing  aspirin 81 mg oral tablet: orally once a day  atorvastatin 40 mg oral tablet: 1 tab(s) orally once a day  buPROPion 300 mg/24 hours (XL) oral tablet, extended release: 1 tab(s) orally once a day (at bedtime)  cevimeline 30 mg oral capsule: 1 cap(s) orally 2 times a day  diazePAM 2 mg oral tablet: 1 tab(s) orally 2 times a day as needed for muscle spasm MDD: 2  doxepin 100 mg oral capsule: 1 cap(s) orally 2 times a day  Flomax 0.4 mg oral capsule: 1 cap(s) orally once a day  levothyroxine 50 mcg (0.05 mg) oral tablet: 1 tab(s) orally once a day  Lyrica 75 mg oral capsule: 1 cap(s) orally once a day (at bedtime)  Medrol Dosepak 4 mg oral tablet: 4 milligram(s) orally once a day please take as directed  metoprolol succinate 25 mg oral capsule, extended release: 1 cap(s) orally once a day  Narcan 4 mg/0.1 mL nasal spray: 4 milligram(s) intranasally once as needed for narcotic overdose  omeprazole 40 mg oral delayed release capsule: 1 cap(s) orally once a day  oxyCODONE 5 mg oral tablet: 1 tab(s) orally every 6 hours as needed for  mild pain MDD: 4  Senna S 50 mg-8.6 mg oral tablet: 2 tab(s) orally once a day (at bedtime)  Trelegy Ellipta 200 mcg-62.5 mcg-25 mcg/inh inhalation powder: 1 puff(s) inhaled once a day  Tylenol 325 mg oral tablet: 3 tab(s) orally every 8 hours as needed for  mild pain

## 2024-05-30 NOTE — DISCHARGE NOTE PROVIDER - NSDCFUADDINST_GEN_ALL_CORE_FT
* Office follow up in 7-10 Days. Please call office to set up appt upon DC.  * Patient may shower once drain is removed or POD#2 if no drain present. Please change dressings daily after shower if they get wet. Do NOT soak in tub or pool.   * Patient may complete ADLs. No lifting greater than 2lbs. No aggressive PT. Patient may walk outdoors without exerting oneself.   * Patient will take Senna S or similar medication to help prevent narcotic induced constipation.   * Patient may resume home medications as prescribed unless noted.    * Call office with any concerns.  * Office follow up in 7-10 Days. Please call office to set up appt upon DC.  * Patient may shower. Must try to avoid direct water contact to dressing. Do NOT soak in tub or pool.   * Patient may complete ADLs. No lifting greater than 2lbs. No aggressive PT. Patient may walk outdoors without exerting oneself.   * Patient will take Senna S or similar medication to help prevent narcotic induced constipation.   * Patient may resume home medications as prescribed unless noted.   * Patient will continue Flomax and follow up with Urologist for urinary retention   * Call office with any concerns.
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
## 2024-05-30 NOTE — PHYSICAL THERAPY INITIAL EVALUATION ADULT - ADDITIONAL COMMENTS
pt reports living in private home with  who is able to assist prn. Pt has no steps to enter/inside. Independent prior to admission. Owns a cane

## 2024-05-30 NOTE — DISCHARGE NOTE PROVIDER - CARE PROVIDERS DIRECT ADDRESSES
,DirectAddress_Unknown ,DirectAddress_Unknown,[email or web address]

## 2024-05-31 LAB
ANION GAP SERPL CALC-SCNC: 15 MMOL/L — SIGNIFICANT CHANGE UP (ref 5–17)
BUN SERPL-MCNC: 12.8 MG/DL — SIGNIFICANT CHANGE UP (ref 8–20)
CALCIUM SERPL-MCNC: 8.4 MG/DL — SIGNIFICANT CHANGE UP (ref 8.4–10.5)
CHLORIDE SERPL-SCNC: 98 MMOL/L — SIGNIFICANT CHANGE UP (ref 96–108)
CO2 SERPL-SCNC: 19 MMOL/L — LOW (ref 22–29)
CREAT SERPL-MCNC: 1.43 MG/DL — HIGH (ref 0.5–1.3)
EGFR: 43 ML/MIN/1.73M2 — LOW
GLUCOSE SERPL-MCNC: 171 MG/DL — HIGH (ref 70–99)
HCT VFR BLD CALC: 32.8 % — LOW (ref 34.5–45)
HGB BLD-MCNC: 11 G/DL — LOW (ref 11.5–15.5)
MAGNESIUM SERPL-MCNC: 1.8 MG/DL — SIGNIFICANT CHANGE UP (ref 1.6–2.6)
MCHC RBC-ENTMCNC: 29.2 PG — SIGNIFICANT CHANGE UP (ref 27–34)
MCHC RBC-ENTMCNC: 33.5 GM/DL — SIGNIFICANT CHANGE UP (ref 32–36)
MCV RBC AUTO: 87 FL — SIGNIFICANT CHANGE UP (ref 80–100)
PHOSPHATE SERPL-MCNC: 2.7 MG/DL — SIGNIFICANT CHANGE UP (ref 2.4–4.7)
PLATELET # BLD AUTO: 250 K/UL — SIGNIFICANT CHANGE UP (ref 150–400)
POTASSIUM SERPL-MCNC: 4.1 MMOL/L — SIGNIFICANT CHANGE UP (ref 3.5–5.3)
POTASSIUM SERPL-SCNC: 4.1 MMOL/L — SIGNIFICANT CHANGE UP (ref 3.5–5.3)
RBC # BLD: 3.77 M/UL — LOW (ref 3.8–5.2)
RBC # FLD: 14.9 % — HIGH (ref 10.3–14.5)
SODIUM SERPL-SCNC: 132 MMOL/L — LOW (ref 135–145)
WBC # BLD: 13 K/UL — HIGH (ref 3.8–10.5)
WBC # FLD AUTO: 13 K/UL — HIGH (ref 3.8–10.5)

## 2024-05-31 PROCEDURE — 99232 SBSQ HOSP IP/OBS MODERATE 35: CPT

## 2024-05-31 RX ORDER — TAMSULOSIN HYDROCHLORIDE 0.4 MG/1
0.4 CAPSULE ORAL ONCE
Refills: 0 | Status: COMPLETED | OUTPATIENT
Start: 2024-05-31 | End: 2024-05-31

## 2024-05-31 RX ORDER — MULTIVIT WITH MIN/MFOLATE/K2 340-15/3 G
296 POWDER (GRAM) ORAL ONCE
Refills: 0 | Status: COMPLETED | OUTPATIENT
Start: 2024-05-31 | End: 2024-05-31

## 2024-05-31 RX ORDER — BENZOCAINE AND MENTHOL 5; 1 G/100ML; G/100ML
1 LIQUID ORAL THREE TIMES A DAY
Refills: 0 | Status: DISCONTINUED | OUTPATIENT
Start: 2024-05-31 | End: 2024-06-02

## 2024-05-31 RX ORDER — DIAZEPAM 5 MG
2 TABLET ORAL
Refills: 0 | Status: DISCONTINUED | OUTPATIENT
Start: 2024-05-31 | End: 2024-06-02

## 2024-05-31 RX ADMIN — BENZOCAINE AND MENTHOL 1 LOZENGE: 5; 1 LIQUID ORAL at 08:50

## 2024-05-31 RX ADMIN — OXYCODONE HYDROCHLORIDE 10 MILLIGRAM(S): 5 TABLET ORAL at 09:29

## 2024-05-31 RX ADMIN — OXYCODONE HYDROCHLORIDE 10 MILLIGRAM(S): 5 TABLET ORAL at 13:54

## 2024-05-31 RX ADMIN — OXYCODONE HYDROCHLORIDE 10 MILLIGRAM(S): 5 TABLET ORAL at 00:51

## 2024-05-31 RX ADMIN — Medication 50 MICROGRAM(S): at 05:24

## 2024-05-31 RX ADMIN — PANTOPRAZOLE SODIUM 40 MILLIGRAM(S): 20 TABLET, DELAYED RELEASE ORAL at 05:25

## 2024-05-31 RX ADMIN — OXYCODONE HYDROCHLORIDE 10 MILLIGRAM(S): 5 TABLET ORAL at 20:43

## 2024-05-31 RX ADMIN — Medication 975 MILLIGRAM(S): at 05:25

## 2024-05-31 RX ADMIN — Medication 10 MILLIGRAM(S): at 10:33

## 2024-05-31 RX ADMIN — OXYCODONE HYDROCHLORIDE 10 MILLIGRAM(S): 5 TABLET ORAL at 01:51

## 2024-05-31 RX ADMIN — BUPROPION HYDROCHLORIDE 300 MILLIGRAM(S): 150 TABLET, EXTENDED RELEASE ORAL at 13:48

## 2024-05-31 RX ADMIN — OXYCODONE HYDROCHLORIDE 10 MILLIGRAM(S): 5 TABLET ORAL at 19:43

## 2024-05-31 RX ADMIN — Medication 75 MILLIGRAM(S): at 22:01

## 2024-05-31 RX ADMIN — Medication 296 MILLILITER(S): at 13:47

## 2024-05-31 RX ADMIN — BENZOCAINE AND MENTHOL 1 LOZENGE: 5; 1 LIQUID ORAL at 19:44

## 2024-05-31 RX ADMIN — SENNA PLUS 2 TABLET(S): 8.6 TABLET ORAL at 22:01

## 2024-05-31 RX ADMIN — SODIUM CHLORIDE 125 MILLILITER(S): 9 INJECTION INTRAMUSCULAR; INTRAVENOUS; SUBCUTANEOUS at 05:24

## 2024-05-31 RX ADMIN — OXYCODONE HYDROCHLORIDE 10 MILLIGRAM(S): 5 TABLET ORAL at 06:31

## 2024-05-31 RX ADMIN — Medication 975 MILLIGRAM(S): at 06:25

## 2024-05-31 RX ADMIN — OXYCODONE HYDROCHLORIDE 10 MILLIGRAM(S): 5 TABLET ORAL at 08:49

## 2024-05-31 RX ADMIN — Medication 81 MILLIGRAM(S): at 13:48

## 2024-05-31 RX ADMIN — Medication 2000 MILLIGRAM(S): at 00:30

## 2024-05-31 RX ADMIN — Medication 200 MILLIGRAM(S): at 22:02

## 2024-05-31 RX ADMIN — TAMSULOSIN HYDROCHLORIDE 0.4 MILLIGRAM(S): 0.4 CAPSULE ORAL at 10:42

## 2024-05-31 RX ADMIN — BENZOCAINE AND MENTHOL 1 LOZENGE: 5; 1 LIQUID ORAL at 13:54

## 2024-05-31 RX ADMIN — OXYCODONE HYDROCHLORIDE 10 MILLIGRAM(S): 5 TABLET ORAL at 05:31

## 2024-05-31 RX ADMIN — ATORVASTATIN CALCIUM 40 MILLIGRAM(S): 80 TABLET, FILM COATED ORAL at 22:01

## 2024-05-31 RX ADMIN — Medication 975 MILLIGRAM(S): at 22:01

## 2024-05-31 RX ADMIN — Medication 975 MILLIGRAM(S): at 23:01

## 2024-05-31 RX ADMIN — Medication 25 MILLIGRAM(S): at 05:24

## 2024-05-31 RX ADMIN — OXYCODONE HYDROCHLORIDE 10 MILLIGRAM(S): 5 TABLET ORAL at 14:50

## 2024-05-31 NOTE — SBIRT NOTE ADULT - NSSBIRTDRGPOSREINDET_GEN_A_CORE
Patient denies history of substance use and denied current substance use. SW offered substance use resources, patient declined.

## 2024-05-31 NOTE — SBIRT NOTE ADULT - NSSBIRTALCPOSREINDET_GEN_A_CORE
Patient denies history of alcohol use and denies history of alcohol use. SW offered alcohol use resources, patient declined.

## 2024-06-01 ENCOUNTER — TRANSCRIPTION ENCOUNTER (OUTPATIENT)
Age: 59
End: 2024-06-01

## 2024-06-01 PROCEDURE — 99232 SBSQ HOSP IP/OBS MODERATE 35: CPT

## 2024-06-01 RX ORDER — HYDRALAZINE HCL 50 MG
25 TABLET ORAL EVERY 6 HOURS
Refills: 0 | Status: DISCONTINUED | OUTPATIENT
Start: 2024-06-01 | End: 2024-06-02

## 2024-06-01 RX ORDER — TAMSULOSIN HYDROCHLORIDE 0.4 MG/1
1 CAPSULE ORAL
Qty: 28 | Refills: 0
Start: 2024-06-01 | End: 2024-06-28

## 2024-06-01 RX ORDER — DIAZEPAM 5 MG
1 TABLET ORAL
Qty: 14 | Refills: 0
Start: 2024-06-01 | End: 2024-06-07

## 2024-06-01 RX ORDER — OXYCODONE HYDROCHLORIDE 5 MG/1
1 TABLET ORAL
Qty: 28 | Refills: 0
Start: 2024-06-01 | End: 2024-06-07

## 2024-06-01 RX ORDER — METOPROLOL TARTRATE 50 MG
25 TABLET ORAL ONCE
Refills: 0 | Status: COMPLETED | OUTPATIENT
Start: 2024-06-01 | End: 2024-06-01

## 2024-06-01 RX ORDER — CYCLOBENZAPRINE HYDROCHLORIDE 10 MG/1
1 TABLET, FILM COATED ORAL
Refills: 0 | DISCHARGE

## 2024-06-01 RX ORDER — SENNOSIDES/DOCUSATE SODIUM 8.6MG-50MG
2 TABLET ORAL
Qty: 28 | Refills: 0
Start: 2024-06-01 | End: 2024-06-14

## 2024-06-01 RX ORDER — NALOXONE HYDROCHLORIDE 4 MG/.1ML
4 SPRAY NASAL
Qty: 1 | Refills: 0
Start: 2024-06-01

## 2024-06-01 RX ORDER — ACETAMINOPHEN 500 MG
3 TABLET ORAL
Qty: 63 | Refills: 0
Start: 2024-06-01 | End: 2024-06-07

## 2024-06-01 RX ADMIN — Medication 975 MILLIGRAM(S): at 13:46

## 2024-06-01 RX ADMIN — ATORVASTATIN CALCIUM 40 MILLIGRAM(S): 80 TABLET, FILM COATED ORAL at 21:45

## 2024-06-01 RX ADMIN — SENNA PLUS 2 TABLET(S): 8.6 TABLET ORAL at 21:46

## 2024-06-01 RX ADMIN — Medication 25 MILLIGRAM(S): at 18:02

## 2024-06-01 RX ADMIN — OXYCODONE HYDROCHLORIDE 10 MILLIGRAM(S): 5 TABLET ORAL at 10:23

## 2024-06-01 RX ADMIN — Medication 975 MILLIGRAM(S): at 12:46

## 2024-06-01 RX ADMIN — Medication 25 MILLIGRAM(S): at 06:00

## 2024-06-01 RX ADMIN — SODIUM CHLORIDE 125 MILLILITER(S): 9 INJECTION INTRAMUSCULAR; INTRAVENOUS; SUBCUTANEOUS at 06:03

## 2024-06-01 RX ADMIN — BENZOCAINE AND MENTHOL 1 LOZENGE: 5; 1 LIQUID ORAL at 06:02

## 2024-06-01 RX ADMIN — OXYCODONE HYDROCHLORIDE 10 MILLIGRAM(S): 5 TABLET ORAL at 12:46

## 2024-06-01 RX ADMIN — Medication 975 MILLIGRAM(S): at 22:44

## 2024-06-01 RX ADMIN — Medication 975 MILLIGRAM(S): at 06:01

## 2024-06-01 RX ADMIN — OXYCODONE HYDROCHLORIDE 10 MILLIGRAM(S): 5 TABLET ORAL at 16:00

## 2024-06-01 RX ADMIN — BUPROPION HYDROCHLORIDE 300 MILLIGRAM(S): 150 TABLET, EXTENDED RELEASE ORAL at 11:34

## 2024-06-01 RX ADMIN — OXYCODONE HYDROCHLORIDE 10 MILLIGRAM(S): 5 TABLET ORAL at 04:14

## 2024-06-01 RX ADMIN — PANTOPRAZOLE SODIUM 40 MILLIGRAM(S): 20 TABLET, DELAYED RELEASE ORAL at 06:01

## 2024-06-01 RX ADMIN — Medication 975 MILLIGRAM(S): at 21:44

## 2024-06-01 RX ADMIN — OXYCODONE HYDROCHLORIDE 10 MILLIGRAM(S): 5 TABLET ORAL at 05:14

## 2024-06-01 RX ADMIN — OXYCODONE HYDROCHLORIDE 10 MILLIGRAM(S): 5 TABLET ORAL at 13:46

## 2024-06-01 RX ADMIN — CEVIMELINE 30 MILLIGRAM(S): 30 CAPSULE ORAL at 22:05

## 2024-06-01 RX ADMIN — OXYCODONE HYDROCHLORIDE 10 MILLIGRAM(S): 5 TABLET ORAL at 17:00

## 2024-06-01 RX ADMIN — ONDANSETRON 4 MILLIGRAM(S): 8 TABLET, FILM COATED ORAL at 17:35

## 2024-06-01 RX ADMIN — Medication 50 MICROGRAM(S): at 06:00

## 2024-06-01 RX ADMIN — OXYCODONE HYDROCHLORIDE 10 MILLIGRAM(S): 5 TABLET ORAL at 09:23

## 2024-06-01 RX ADMIN — BENZOCAINE AND MENTHOL 1 LOZENGE: 5; 1 LIQUID ORAL at 12:46

## 2024-06-01 RX ADMIN — Medication 25 MILLIGRAM(S): at 16:00

## 2024-06-01 RX ADMIN — Medication 81 MILLIGRAM(S): at 11:34

## 2024-06-01 RX ADMIN — Medication 75 MILLIGRAM(S): at 21:45

## 2024-06-01 RX ADMIN — Medication 975 MILLIGRAM(S): at 06:23

## 2024-06-01 RX ADMIN — Medication 25 MILLIGRAM(S): at 22:33

## 2024-06-01 RX ADMIN — Medication 200 MILLIGRAM(S): at 21:54

## 2024-06-01 NOTE — DISCHARGE NOTE NURSING/CASE MANAGEMENT/SOCIAL WORK - PATIENT PORTAL LINK FT
You can access the FollowMyHealth Patient Portal offered by [facility] by registering at the following website: [email or web address]. By joining [facility]’s FollowMyHealth portal, you will also be able to view your health information using other applications (apps) compatible with our system.

## 2024-06-01 NOTE — PROVIDER CONTACT NOTE (OTHER) - SITUATION
Pt voided 650ml and post void bladder scan shows 588ml. Pt states that she feels empty.
BP elevated at 196/103 s/p hydralazine given prn an hour and a half prior
BP elevated at 13:45 at 170/90 and elevated again at 15:48 at 183/94

## 2024-06-01 NOTE — PROVIDER CONTACT NOTE (OTHER) - ASSESSMENT
A&Ox4. VSS. Stable.
A&Ox4. BP elevated but all other VSS. Stable.
A&Ox4. BP elevated but all other VSS. Stable.

## 2024-06-01 NOTE — DISCHARGE NOTE NURSING/CASE MANAGEMENT/SOCIAL WORK - NSDCPEWEB_GEN_ALL_CORE
[facility] for Tobacco Control website --- [email or web address] --- [email or web address]

## 2024-06-01 NOTE — PROVIDER CONTACT NOTE (OTHER) - RECOMMENDATIONS
Hydralazine ordered PRN.
Lopressor ordered STAT.
As per Dr. Blackburn, no interventions required inpatient. Plan ongoing.

## 2024-06-01 NOTE — PROVIDER CONTACT NOTE (OTHER) - ACTION/TREATMENT ORDERED:
Lopressor given STAT. Will repeat BP. Plan ongoing.
Hydralazine PRN given. Will repeat BP. Plan ongoing.
As per Dr. Blackburn, no interventions required inpatient. Plan ongoing.

## 2024-06-01 NOTE — PROVIDER CONTACT NOTE (OTHER) - BACKGROUND
DX Herniation of intervertebral disc of c. spine. 5/30/24 ACDF. HX CKD, Insomnia, sjogren disease, etc
DX Herniation of intervertebral disc of c. spine. ACDF done 5/30/24. HX Sjogren disease, CKD, insomnia, etc
DX Herniation of intervertebral disc of c. spine. 5/30/24 ACDF. HX CKD, Insomnia, sjogren disease, etc

## 2024-06-02 VITALS
RESPIRATION RATE: 18 BRPM | SYSTOLIC BLOOD PRESSURE: 162 MMHG | OXYGEN SATURATION: 96 % | HEART RATE: 101 BPM | DIASTOLIC BLOOD PRESSURE: 90 MMHG | TEMPERATURE: 98 F

## 2024-06-02 PROCEDURE — 99232 SBSQ HOSP IP/OBS MODERATE 35: CPT

## 2024-06-02 RX ORDER — DIAZEPAM 5 MG
2 TABLET ORAL ONCE
Refills: 0 | Status: DISCONTINUED | OUTPATIENT
Start: 2024-06-02 | End: 2024-06-02

## 2024-06-02 RX ORDER — NICOTINE POLACRILEX 2 MG
1 GUM BUCCAL DAILY
Refills: 0 | Status: DISCONTINUED | OUTPATIENT
Start: 2024-06-02 | End: 2024-06-02

## 2024-06-02 RX ADMIN — OXYCODONE HYDROCHLORIDE 10 MILLIGRAM(S): 5 TABLET ORAL at 13:24

## 2024-06-02 RX ADMIN — Medication 975 MILLIGRAM(S): at 13:24

## 2024-06-02 RX ADMIN — OXYCODONE HYDROCHLORIDE 10 MILLIGRAM(S): 5 TABLET ORAL at 08:49

## 2024-06-02 RX ADMIN — Medication 2 MILLIGRAM(S): at 05:09

## 2024-06-02 RX ADMIN — Medication 81 MILLIGRAM(S): at 12:17

## 2024-06-02 RX ADMIN — PANTOPRAZOLE SODIUM 40 MILLIGRAM(S): 20 TABLET, DELAYED RELEASE ORAL at 05:09

## 2024-06-02 RX ADMIN — Medication 25 MILLIGRAM(S): at 05:58

## 2024-06-02 RX ADMIN — Medication 25 MILLIGRAM(S): at 05:09

## 2024-06-02 RX ADMIN — Medication 975 MILLIGRAM(S): at 05:08

## 2024-06-02 RX ADMIN — ONDANSETRON 4 MILLIGRAM(S): 8 TABLET, FILM COATED ORAL at 10:34

## 2024-06-02 RX ADMIN — BUPROPION HYDROCHLORIDE 300 MILLIGRAM(S): 150 TABLET, EXTENDED RELEASE ORAL at 12:18

## 2024-06-02 RX ADMIN — Medication 975 MILLIGRAM(S): at 06:08

## 2024-06-02 RX ADMIN — Medication 50 MICROGRAM(S): at 05:09

## 2024-06-02 RX ADMIN — Medication 2 MILLIGRAM(S): at 12:18

## 2024-06-02 RX ADMIN — CEVIMELINE 30 MILLIGRAM(S): 30 CAPSULE ORAL at 09:08

## 2024-06-02 NOTE — CHART NOTE - NSCHARTNOTEFT_GEN_A_CORE
Patient BP improved with Valium 2 mg  her high BP was due to anxiety   she was told to take as needed and check her BP at home  spoke with daughter as well.   patient is stable from the medicine point of view for discharge
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
## 2024-06-03 ENCOUNTER — EMERGENCY (EMERGENCY)
Facility: HOSPITAL | Age: 59
LOS: 1 days | Discharge: HOME | End: 2024-06-03
Attending: STUDENT IN AN ORGANIZED HEALTH CARE EDUCATION/TRAINING PROGRAM
Payer: COMMERCIAL

## 2024-06-03 VITALS
OXYGEN SATURATION: 95 % | RESPIRATION RATE: 20 BRPM | DIASTOLIC BLOOD PRESSURE: 120 MMHG | WEIGHT: 194.89 LBS | TEMPERATURE: 98 F | HEART RATE: 116 BPM | HEIGHT: 67 IN | SYSTOLIC BLOOD PRESSURE: 194 MMHG

## 2024-06-03 DIAGNOSIS — Z90.710 ACQUIRED ABSENCE OF BOTH CERVIX AND UTERUS: Chronic | ICD-10-CM

## 2024-06-03 DIAGNOSIS — Z98.890 OTHER SPECIFIED POSTPROCEDURAL STATES: Chronic | ICD-10-CM

## 2024-06-03 DIAGNOSIS — Z98.1 ARTHRODESIS STATUS: Chronic | ICD-10-CM

## 2024-06-03 DIAGNOSIS — Z90.89 ACQUIRED ABSENCE OF OTHER ORGANS: Chronic | ICD-10-CM

## 2024-06-03 PROCEDURE — 93010 ELECTROCARDIOGRAM REPORT: CPT

## 2024-06-03 PROCEDURE — 99285 EMERGENCY DEPT VISIT HI MDM: CPT

## 2024-06-04 VITALS
SYSTOLIC BLOOD PRESSURE: 193 MMHG | DIASTOLIC BLOOD PRESSURE: 107 MMHG | RESPIRATION RATE: 20 BRPM | TEMPERATURE: 98 F | OXYGEN SATURATION: 97 % | HEART RATE: 89 BPM
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
## 2024-06-04 PROBLEM — G47.00 INSOMNIA, UNSPECIFIED: Chronic | Status: ACTIVE | Noted: 2024-05-10

## 2024-06-04 PROBLEM — E03.9 HYPOTHYROIDISM, UNSPECIFIED: Chronic | Status: ACTIVE | Noted: 2024-05-10

## 2024-06-04 PROBLEM — N18.30 CHRONIC KIDNEY DISEASE, STAGE 3 UNSPECIFIED: Chronic | Status: ACTIVE | Noted: 2024-05-10

## 2024-06-04 PROBLEM — Z86.79 PERSONAL HISTORY OF OTHER DISEASES OF THE CIRCULATORY SYSTEM: Chronic | Status: ACTIVE | Noted: 2024-05-10

## 2024-06-04 PROBLEM — E78.5 HYPERLIPIDEMIA, UNSPECIFIED: Chronic | Status: ACTIVE | Noted: 2024-05-10

## 2024-06-04 PROBLEM — M35.00 SJOGREN SYNDROME, UNSPECIFIED: Chronic | Status: ACTIVE | Noted: 2024-05-10

## 2024-06-04 PROBLEM — M50.20 OTHER CERVICAL DISC DISPLACEMENT, UNSPECIFIED CERVICAL REGION: Chronic | Status: ACTIVE | Noted: 2024-05-10

## 2024-06-04 PROBLEM — J45.909 UNSPECIFIED ASTHMA, UNCOMPLICATED: Chronic | Status: ACTIVE | Noted: 2024-05-10

## 2024-06-04 LAB
ANION GAP SERPL CALC-SCNC: 14 MMOL/L — SIGNIFICANT CHANGE UP (ref 5–17)
BASOPHILS # BLD AUTO: 0.01 K/UL — SIGNIFICANT CHANGE UP (ref 0–0.2)
BASOPHILS NFR BLD AUTO: 0.1 % — SIGNIFICANT CHANGE UP (ref 0–2)
BUN SERPL-MCNC: 11 MG/DL — SIGNIFICANT CHANGE UP (ref 8–20)
CALCIUM SERPL-MCNC: 9.9 MG/DL — SIGNIFICANT CHANGE UP (ref 8.4–10.5)
CHLORIDE SERPL-SCNC: 96 MMOL/L — SIGNIFICANT CHANGE UP (ref 96–108)
CO2 SERPL-SCNC: 27 MMOL/L — SIGNIFICANT CHANGE UP (ref 22–29)
CREAT SERPL-MCNC: 1.27 MG/DL — SIGNIFICANT CHANGE UP (ref 0.5–1.3)
EGFR: 49 ML/MIN/1.73M2 — LOW
EOSINOPHIL # BLD AUTO: 0.01 K/UL — SIGNIFICANT CHANGE UP (ref 0–0.5)
EOSINOPHIL NFR BLD AUTO: 0.1 % — SIGNIFICANT CHANGE UP (ref 0–6)
GLUCOSE SERPL-MCNC: 142 MG/DL — HIGH (ref 70–99)
HCT VFR BLD CALC: 37.5 % — SIGNIFICANT CHANGE UP (ref 34.5–45)
HGB BLD-MCNC: 12.7 G/DL — SIGNIFICANT CHANGE UP (ref 11.5–15.5)
IMM GRANULOCYTES NFR BLD AUTO: 0.7 % — SIGNIFICANT CHANGE UP (ref 0–0.9)
LYMPHOCYTES # BLD AUTO: 0.64 K/UL — LOW (ref 1–3.3)
LYMPHOCYTES # BLD AUTO: 8.9 % — LOW (ref 13–44)
MCHC RBC-ENTMCNC: 28.9 PG — SIGNIFICANT CHANGE UP (ref 27–34)
MCHC RBC-ENTMCNC: 33.9 GM/DL — SIGNIFICANT CHANGE UP (ref 32–36)
MCV RBC AUTO: 85.4 FL — SIGNIFICANT CHANGE UP (ref 80–100)
MONOCYTES # BLD AUTO: 0.27 K/UL — SIGNIFICANT CHANGE UP (ref 0–0.9)
MONOCYTES NFR BLD AUTO: 3.8 % — SIGNIFICANT CHANGE UP (ref 2–14)
NEUTROPHILS # BLD AUTO: 6.22 K/UL — SIGNIFICANT CHANGE UP (ref 1.8–7.4)
NEUTROPHILS NFR BLD AUTO: 86.4 % — HIGH (ref 43–77)
PLATELET # BLD AUTO: 310 K/UL — SIGNIFICANT CHANGE UP (ref 150–400)
POTASSIUM SERPL-MCNC: 4.7 MMOL/L — SIGNIFICANT CHANGE UP (ref 3.5–5.3)
POTASSIUM SERPL-SCNC: 4.7 MMOL/L — SIGNIFICANT CHANGE UP (ref 3.5–5.3)
RBC # BLD: 4.39 M/UL — SIGNIFICANT CHANGE UP (ref 3.8–5.2)
RBC # FLD: 14.8 % — HIGH (ref 10.3–14.5)
SODIUM SERPL-SCNC: 137 MMOL/L — SIGNIFICANT CHANGE UP (ref 135–145)
WBC # BLD: 7.2 K/UL — SIGNIFICANT CHANGE UP (ref 3.8–10.5)
WBC # FLD AUTO: 7.2 K/UL — SIGNIFICANT CHANGE UP (ref 3.8–10.5)

## 2024-06-04 PROCEDURE — 93005 ELECTROCARDIOGRAM TRACING: CPT

## 2024-06-04 PROCEDURE — 36415 COLL VENOUS BLD VENIPUNCTURE: CPT

## 2024-06-04 PROCEDURE — 96374 THER/PROPH/DIAG INJ IV PUSH: CPT

## 2024-06-04 PROCEDURE — 84436 ASSAY OF TOTAL THYROXINE: CPT

## 2024-06-04 PROCEDURE — 84443 ASSAY THYROID STIM HORMONE: CPT

## 2024-06-04 PROCEDURE — 80048 BASIC METABOLIC PNL TOTAL CA: CPT

## 2024-06-04 PROCEDURE — 70450 CT HEAD/BRAIN W/O DYE: CPT | Mod: 26,MC

## 2024-06-04 PROCEDURE — 99285 EMERGENCY DEPT VISIT HI MDM: CPT | Mod: 25

## 2024-06-04 PROCEDURE — 83735 ASSAY OF MAGNESIUM: CPT

## 2024-06-04 PROCEDURE — 85025 COMPLETE CBC W/AUTO DIFF WBC: CPT

## 2024-06-04 PROCEDURE — 84480 ASSAY TRIIODOTHYRONINE (T3): CPT

## 2024-06-04 PROCEDURE — 70450 CT HEAD/BRAIN W/O DYE: CPT | Mod: MC

## 2024-06-04 RX ORDER — METOPROLOL TARTRATE 50 MG
25 TABLET ORAL DAILY
Refills: 0 | Status: DISCONTINUED | OUTPATIENT
Start: 2024-06-04 | End: 2024-06-11

## 2024-06-04 RX ORDER — ACETAMINOPHEN 500 MG
1000 TABLET ORAL ONCE
Refills: 0 | Status: COMPLETED | OUTPATIENT
Start: 2024-06-04 | End: 2024-06-04

## 2024-06-04 RX ORDER — AMLODIPINE BESYLATE 2.5 MG/1
2.5 TABLET ORAL ONCE
Refills: 0 | Status: COMPLETED | OUTPATIENT
Start: 2024-06-04 | End: 2024-06-04

## 2024-06-04 RX ORDER — OXYCODONE HYDROCHLORIDE 5 MG/1
10 TABLET ORAL ONCE
Refills: 0 | Status: DISCONTINUED | OUTPATIENT
Start: 2024-06-04 | End: 2024-06-04

## 2024-06-04 RX ORDER — AMLODIPINE BESYLATE 2.5 MG/1
1 TABLET ORAL
Qty: 21 | Refills: 0
Start: 2024-06-04 | End: 2024-06-24

## 2024-06-04 RX ORDER — DIAZEPAM 5 MG
2 TABLET ORAL ONCE
Refills: 0 | Status: DISCONTINUED | OUTPATIENT
Start: 2024-06-04 | End: 2024-06-04

## 2024-06-04 RX ADMIN — Medication 25 MILLIGRAM(S): at 02:57

## 2024-06-04 RX ADMIN — OXYCODONE HYDROCHLORIDE 10 MILLIGRAM(S): 5 TABLET ORAL at 06:48

## 2024-06-04 RX ADMIN — AMLODIPINE BESYLATE 2.5 MILLIGRAM(S): 2.5 TABLET ORAL at 05:27

## 2024-06-04 RX ADMIN — OXYCODONE HYDROCHLORIDE 10 MILLIGRAM(S): 5 TABLET ORAL at 02:58

## 2024-06-04 RX ADMIN — Medication 2 MILLIGRAM(S): at 02:57

## 2024-06-04 RX ADMIN — Medication 400 MILLIGRAM(S): at 02:58

## 2024-06-04 NOTE — ED PROVIDER NOTE - NEUROLOGICAL, MLM
as per pt his blood pressure was 200/111 at home 1 hour ago. pt denies any symptoms. "Im asymptomatic right now". h/o HTN, DM Alert and oriented, no focal deficits, no motor or sensory deficits.

## 2024-06-04 NOTE — ED ADULT NURSE NOTE - SUICIDE SCREENING QUESTION 2
Based on Standards of Care pt within % IBW thus actual body weight used for all calculations. Needs adjusted based on age, postoperative status.  No

## 2024-06-04 NOTE — ED PROVIDER NOTE - CLINICAL SUMMARY MEDICAL DECISION MAKING FREE TEXT BOX
58-year-old female with recent cervical fusion presents with elevated blood pressure reading at home.  No signs or symptoms of hypertensive emergency at this time.  May partially be pain mediated.  Will provide her prescribed pain medications and muscle relaxer, as well as her nightly scheduled Toprol.  Basic blood work, reassess

## 2024-06-04 NOTE — ED ADULT NURSE NOTE - OBJECTIVE STATEMENT
pt is a 58y female aox4, ambulatory c/o elevated bp.  states she was dc yesterday after neck fusion.  denies sob, cp, palpitations, nvd.  reps even and unlabored

## 2024-06-04 NOTE — ED PROVIDER NOTE - PHYSICAL EXAMINATION
General: Awake, alert, lying in bed in NAD  HEENT: Normocephalic, atraumatic. No scleral icterus or conjunctival injection. EOMI. Moist mucous membranes. Oropharynx clear.   Neck:. Soft and supple.  overlying gauze to surgical site over right neck  Cardiac: RRR, Peripheral pulses 2+ and symmetric. No LE edema.  Resp: Lungs CTAB. No accessory muscle use  Abd: Soft, non-tender, non-distended. No guarding, rebound, or rigidity.  Back: Spine midline and non-tender.   Skin: No rashes, abrasions, or lacerations.  Neuro: AO x 4. CN II-XII intact. 5/5 strength in all extremities. No dysmetria on finger to nose   Psych: Appropriate mood and affect

## 2024-06-04 NOTE — ED PROVIDER NOTE - NSDCPRINTRESULTS_ED_ALL_ED
ID (Select)    Pt seen and examined  Labs, cultures, imaging reviewed  Please see hand written note in select chart    D/w patient, RN    Malinda Martin MD     Patient requests all Lab, Cardiology, and Radiology Results on their Discharge Instructions

## 2024-06-04 NOTE — ED PROVIDER NOTE - PATIENT PORTAL LINK FT
You can access the FollowMyHealth Patient Portal offered by [facility] by registering at the following website: [email or web address]. By joining [facility]’s FollowMyHealth portal, you will also be able to view your health information using other applications (apps) compatible with our system.

## 2024-06-04 NOTE — ED PROVIDER NOTE - NSFOLLOWUPINSTRUCTIONS_ED_ALL_ED_FT
Hypertension    Hypertension, commonly called high blood pressure, is when the force of blood pumping through your arteries is too strong. Hypertension forces your heart to work harder to pump blood. Your arteries may become narrow or stiff. Having untreated or uncontrolled hypertension for a long period of time can cause heart attack, stroke, kidney disease, and other problems. If started on a medication, take exactly as prescribed by your health care professional. Maintain a healthy lifestyle and follow up with your primary care physician.    SEEK IMMEDIATE MEDICAL CARE IF YOU HAVE ANY OF THE FOLLOWING SYMPTOMS: severe headache, confusion, chest pain, abdominal pain, vomiting, or shortness of breath. Hypertension, Adult  High blood pressure (hypertension) is when the force of blood pumping through the arteries is too strong. The arteries are the blood vessels that carry blood from the heart throughout the body. Hypertension forces the heart to work harder to pump blood and may cause arteries to become narrow or stiff. Untreated or uncontrolled hypertension can lead to a heart attack, heart failure, a stroke, kidney disease, and other problems.    A blood pressure reading consists of a higher number over a lower number. Ideally, your blood pressure should be below 120/80. The first ("top") number is called the systolic pressure. It is a measure of the pressure in your arteries as your heart beats. The second ("bottom") number is called the diastolic pressure. It is a measure of the pressure in your arteries as the heart relaxes.    What are the causes?  The exact cause of this condition is not known. There are some conditions that result in high blood pressure.    What increases the risk?  Certain factors may make you more likely to develop high blood pressure. Some of these risk factors are under your control, including:  Smoking.  Not getting enough exercise or physical activity.  Being overweight.  Having too much fat, sugar, calories, or salt (sodium) in your diet.  Drinking too much alcohol.  Other risk factors include:  Having a personal history of heart disease, diabetes, high cholesterol, or kidney disease.  Stress.  Having a family history of high blood pressure and high cholesterol.  Having obstructive sleep apnea.  Age. The risk increases with age.  What are the signs or symptoms?  High blood pressure may not cause symptoms. Very high blood pressure (hypertensive crisis) may cause:  Headache.  Fast or irregular heartbeats (palpitations).  Shortness of breath.  Nosebleed.  Nausea and vomiting.  Vision changes.  Severe chest pain, dizziness, and seizures.  How is this diagnosed?  This condition is diagnosed by measuring your blood pressure while you are seated, with your arm resting on a flat surface, your legs uncrossed, and your feet flat on the floor. The cuff of the blood pressure monitor will be placed directly against the skin of your upper arm at the level of your heart. Blood pressure should be measured at least twice using the same arm. Certain conditions can cause a difference in blood pressure between your right and left arms.    If you have a high blood pressure reading during one visit or you have normal blood pressure with other risk factors, you may be asked to:  Return on a different day to have your blood pressure checked again.  Monitor your blood pressure at home for 1 week or longer.  If you are diagnosed with hypertension, you may have other blood or imaging tests to help your health care provider understand your overall risk for other conditions.    How is this treated?  This condition is treated by making healthy lifestyle changes, such as eating healthy foods, exercising more, and reducing your alcohol intake. You may be referred for counseling on a healthy diet and physical activity.    Your health care provider may prescribe medicine if lifestyle changes are not enough to get your blood pressure under control and if:  Your systolic blood pressure is above 130.  Your diastolic blood pressure is above 80.  Your personal target blood pressure may vary depending on your medical conditions, your age, and other factors.    Follow these instructions at home:  Eating and drinking    A plate with examples of foods in a healthy diet.  Eat a diet that is high in fiber and potassium, and low in sodium, added sugar, and fat. An example of this eating plan is called the DASH diet. DASH stands for Dietary Approaches to Stop Hypertension. To eat this way:  Eat plenty of fresh fruits and vegetables. Try to fill one half of your plate at each meal with fruits and vegetables.  Eat whole grains, such as whole-wheat pasta, brown rice, or whole-grain bread. Fill about one fourth of your plate with whole grains.  Eat or drink low-fat dairy products, such as skim milk or low-fat yogurt.  Avoid fatty cuts of meat, processed or cured meats, and poultry with skin. Fill about one fourth of your plate with lean proteins, such as fish, chicken without skin, beans, eggs, or tofu.  Avoid pre-made and processed foods. These tend to be higher in sodium, added sugar, and fat.  Reduce your daily sodium intake. Many people with hypertension should eat less than 1,500 mg of sodium a day.  Do not drink alcohol if:  Your health care provider tells you not to drink.  You are pregnant, may be pregnant, or are planning to become pregnant.  If you drink alcohol:  Limit how much you have to:  0–1 drink a day for women.  0–2 drinks a day for men.  Know how much alcohol is in your drink. In the [place] one drink equals one 12 oz bottle of beer (355 mL), one 5 oz glass of wine (148 mL), or one 1½ oz glass of hard liquor (44 mL).  Lifestyle    A blood pressure monitor and cuff.   Work with your health care provider to maintain a healthy body weight or to lose weight. Ask what an ideal weight is for you.  Get at least 30 minutes of exercise that causes your heart to beat faster (aerobic exercise) most days of the week. Activities may include walking, swimming, or biking.  Include exercise to strengthen your muscles (resistance exercise), such as Pilates or lifting weights, as part of your weekly exercise routine. Try to do these types of exercises for 30 minutes at least 3 days a week.  Do not use any products that contain nicotine or tobacco. These products include cigarettes, chewing tobacco, and vaping devices, such as e-cigarettes. If you need help quitting, ask your health care provider.  Monitor your blood pressure at home as told by your health care provider.  Keep all follow-up visits. This is important.  Medicines    Take over-the-counter and prescription medicines only as told by your health care provider. Follow directions carefully. Blood pressure medicines must be taken as prescribed.  Do not skip doses of blood pressure medicine. Doing this puts you at risk for problems and can make the medicine less effective.  Ask your health care provider about side effects or reactions to medicines that you should watch for.  Contact a health care provider if you:  Think you are having a reaction to a medicine you are taking.  Have headaches that keep coming back (recurring).  Feel dizzy.  Have swelling in your ankles.  Have trouble with your vision.  Get help right away if you:  Develop a severe headache or confusion.  Have unusual weakness or numbness.  Feel faint.  Have severe pain in your chest or abdomen.  Vomit repeatedly.  Have trouble breathing.  These symptoms may be an emergency. Get help right away. Call 911.  Do not wait to see if the symptoms will go away.  Do not drive yourself to the hospital.  Summary  Hypertension is when the force of blood pumping through your arteries is too strong. If this condition is not controlled, it may put you at risk for serious complications.  Your personal target blood pressure may vary depending on your medical conditions, your age, and other factors. For most people, a normal blood pressure is less than 120/80.  Hypertension is treated with lifestyle changes, medicines, or a combination of both. Lifestyle changes include losing weight, eating a healthy, low-sodium diet, exercising more, and limiting alcohol.  This information is not intended to replace advice given to you by your health care provider. Make sure you discuss any questions you have with your health care provider.

## 2024-06-04 NOTE — ED PROVIDER NOTE - PROGRESS NOTE DETAILS
Spoke with ortho PA on call as pt is postop from last week. They are aware of pt's visit.     Pt noted to be persistently hypertensive despite pain control. Will obtain CT head and low dose amlodipine. Colton Bonilla MD Pt feels well. Updated pt regarding workup results. Instructed pt to follow up with PMD within a week to discuss elevated blood pressure, return precautions discussed, pt demonstrated understanding. Henri Bonilla MD

## 2024-06-04 NOTE — ED PROVIDER NOTE - OBJECTIVE STATEMENT
58-year-old female presents with elevated blood pressure reading at home.  Patient states that she was discharged yesterday following a cervical fusion surgery.  Prior to discharge, patient's blood pressure was also similarly noted to be elevated, was attributed to anxiety.  Blood pressure was reportedly normal presurgical.  Patient presently reports pain at the surgical site, and is due for her pain medications.  Denies CP, SOB, confusion, focal weakness

## 2024-06-06 NOTE — CHART NOTE - NSCHARTNOTEFT_GEN_A_CORE
Post-Discharge Medication Review	    Patient's preferred pharmacy was updated in OMR: [facility]  	  Patient contacted to offer medication counseling post-discharge. Medication reconciliation completed. Counseling completed based on medications/updates from inpatient admission (Visit ID [id number]) and ED visit (Visit ID [id number]).    Per patient, medications include:	  1.	Albuterol (Eqv-ProAir HFA) 90 mcg/inh inhalation aerosol 2 puff(s) inhaled 2 times a day as needed for shortness of breath and/or wheezing  2.	aspirin 81 mg oral tablet orally once a day  3.	atorvastatin 40 mg oral tablet 1 tab(s) orally once a day  4.	cevimeline 30 mg oral capsule 1 cap(s) orally 2 times a day  5.	diazePAM 2 mg oral tablet 1 tab(s) orally 2 times a day as needed for muscle spasm MDD: 2  6.	Flomax 0.4 mg oral capsule 1 cap(s) orally once a day  7.	levothyroxine 50 mcg (0.05 mg) oral tablet 1 tab(s) orally once a day  8.	Lyrica 75 mg oral capsule 1 cap(s) orally once a day (at bedtime)  9.	metoprolol succinate 25 mg oral capsule, extended release 1 cap(s) orally once a day  10.	Narcan 4 mg/0.1 mL nasal spray 4 milligram(s) intranasally once as needed for narcotic overdose  11.	omeprazole 40 mg oral delayed release capsule 1 cap(s) orally once a day  12.	oxyCODONE 5 mg oral tablet 1 tab(s) orally every 6 hours as needed for mild pain MDD: 4  13.	Senna S 50 mg-8.6 mg oral tablet 2 tab(s) orally once a day (at bedtime)  14.	Tylenol 325 mg oral tablet 3 tab(s) orally every 8 hours as needed for mild pain  15.	amLODIPine 2.5 mg oral tablet; 1 tab(s) orally once a day    	  Medications added to OMR (updated per discussion with patient):  16.	buPROPion 450 mg/24 hours (XL) oral tablet, extended release: 1 tab(s) orally once a day Per patient takes 450 mG daily, unable to confirm with outpatient pharmacy  17.	doxepin 100 mg oral capsule: 2 cap(s) orally once a day (at bedtime)  	  Medications removed from OMR (updated per discussion with patient):	  1.	buPROPion 300 mg/24 hours (XL) oral tablet, extended release 1 tab(s) orally once a day (at bedtime)  2.	doxepin 100 mg oral capsule 1 cap(s) orally 2 times a day  3.	Medrol Dosepak 4 mg oral tablet 4 milligram(s) orally once a day please take as directed  4.	Trelegy Ellipta 200 mcg-62.5 mcg-25 mcg/inh inhalation powder 1 puff(s) inhaled once a day    Medication name, indication, administration, side effect, and monitoring reviewed for new medications during post discharge counseling visit with patient. Patient demonstrated understanding. Counseling offered for all medications.	    The below was discussed during visit:  - Patient stated taking buPROPion 450 mg (not 300 mG) once daily, however, unable to confirm with outpatient pharmacy (OMR updated).  - Patient stated only taking Lyrica 75 mG, as needed, at bedtime. Does not take every day.  - Patient stated not taking Medrol Dosepak; patient took a few pills but stopped taking since she was not eating at home and is concerned about medication tolerability. Escalated patient concern to inpatient ortho team (since patient was followed by ortho while admitted prior to ED visit) who advised for patient to follow-up with outpatient provider/surgeon. I discussed with patient, who is aware to follow-up.  - Patient stated having high BP readings at home. Escalated to ED team who advised for patient to continue to monitor, follow-up with outpatient provider, and return to ED if anything worsens. I discussed with patient, who is aware.  - Patient stated needing more than oxycodone 5 mG to manage pain and has been taking 10 mG; escalated to ED team who advised for patient to follow-up with outpatient provider. I discussed with patient, who is aware.  - Patient stated not taking Trelegy Ellipta inhaler; stated is not ready to start yet.    Michelle Rock, PharmD  Clinical Pharmacy Specialist, Pharmacy Telehealth Team  Can be reached via [employer] Teams or [phone]

## 2024-06-07 RX ORDER — HYDROCODONE BITARTRATE AND ACETAMINOPHEN 5; 300 MG/1; MG/1
5-300 TABLET ORAL EVERY 6 HOURS
Qty: 60 | Refills: 0 | Status: DISCONTINUED | COMMUNITY
Start: 2023-03-08 | End: 2024-06-07

## 2024-06-07 RX ORDER — METHYLPREDNISOLONE 4 MG/1
4 TABLET ORAL
Qty: 1 | Refills: 0 | Status: DISCONTINUED | COMMUNITY
Start: 2023-04-19 | End: 2024-06-07

## 2024-06-12 ENCOUNTER — APPOINTMENT (OUTPATIENT)
Dept: ORTHOPEDIC SURGERY | Facility: CLINIC | Age: 59
End: 2024-06-12

## 2024-06-12 VITALS — HEIGHT: 67 IN | WEIGHT: 200 LBS | BODY MASS INDEX: 31.39 KG/M2

## 2024-06-12 DIAGNOSIS — G95.9 DISEASE OF SPINAL CORD, UNSPECIFIED: ICD-10-CM

## 2024-06-12 PROCEDURE — 99024 POSTOP FOLLOW-UP VISIT: CPT

## 2024-06-12 PROCEDURE — 20974 ESTIM AID BONE HEALG N-INVAS: CPT | Mod: 58

## 2024-06-12 PROCEDURE — 72040 X-RAY EXAM NECK SPINE 2-3 VW: CPT

## 2024-06-12 PROCEDURE — L0120: CPT

## 2024-06-12 RX ORDER — BUPROPION HYDROCHLORIDE 150 MG/1
1 TABLET, EXTENDED RELEASE ORAL
Refills: 0 | DISCHARGE

## 2024-06-12 RX ORDER — FLUTICASONE FUROATE, UMECLIDINIUM BROMIDE AND VILANTEROL TRIFENATATE 200; 62.5; 25 UG/1; UG/1; UG/1
1 POWDER RESPIRATORY (INHALATION)
Refills: 0 | DISCHARGE

## 2024-06-12 RX ORDER — OXYCODONE 5 MG/1
5 TABLET ORAL
Qty: 60 | Refills: 0 | Status: ACTIVE | COMMUNITY
Start: 2024-06-07 | End: 1900-01-01

## 2024-06-12 RX ORDER — METOPROLOL TARTRATE 50 MG
1 TABLET ORAL
Refills: 0 | DISCHARGE

## 2024-06-12 RX ORDER — OMEPRAZOLE 10 MG/1
1 CAPSULE, DELAYED RELEASE ORAL
Refills: 0 | DISCHARGE

## 2024-06-12 RX ORDER — DOXEPIN HCL 100 MG
1 CAPSULE ORAL
Refills: 0 | DISCHARGE

## 2024-06-12 RX ORDER — CEVIMELINE 30 MG/1
1 CAPSULE ORAL
Refills: 0 | DISCHARGE

## 2024-06-12 RX ORDER — ALBUTEROL 90 UG/1
2 AEROSOL, METERED ORAL
Qty: 0 | Refills: 0 | DISCHARGE

## 2024-06-12 RX ORDER — LEVOTHYROXINE SODIUM 125 MCG
1 TABLET ORAL
Refills: 0 | DISCHARGE

## 2024-06-12 RX ORDER — ASPIRIN/CALCIUM CARB/MAGNESIUM 324 MG
0 TABLET ORAL
Refills: 0 | DISCHARGE

## 2024-06-12 RX ORDER — DOXEPIN HCL 100 MG
2 CAPSULE ORAL
Refills: 0 | DISCHARGE

## 2024-06-12 RX ORDER — DIAZEPAM 5 MG/1
5 TABLET ORAL
Qty: 30 | Refills: 0 | Status: ACTIVE | COMMUNITY
Start: 2024-06-07 | End: 1900-01-01

## 2024-06-12 RX ORDER — ATORVASTATIN CALCIUM 80 MG/1
1 TABLET, FILM COATED ORAL
Refills: 0 | DISCHARGE

## 2024-06-15 NOTE — DATA REVIEWED
[FreeTextEntry1] : Health care maintenance:\par check blood work\par follow up with optometry/ophthalmology and dentist for routine exams when due\par up to date with colonoscopy \par declines Flu vaccine\par up to date with Pneumonia vaccinations\par recommend Shingrix vaccination series\par recommend COVID-19 vaccination series\par \par \par HTN- stable, c/w Losartan\par \par Hyperlipidemia- LDL not at goal, increase Atorvastatin to 20 mg daily, c/w Gemfibrozil 600 mg twice daily, improve upon diet and exercise, check blood work\par \par CAD, PVD- c/w management as per cardiology, vascular surgery\par \par Prediabetes- improve upon diet and exercise, check blood work\par \par Thyroid nodule- go for thyroid ultrasound\par \par Obesity- improve upon diet and exercise\par \par GERD- on Omeprazole, following with GI\par  [FreeTextEntry1] : On my interpretations of these images from [facility] in house on 06/12/2024: I have independently reviewed and interpreted these images. 2 V CERVICAL XR- C3/4 ACDF with all-in-one cage above prior C4-6 ACDF, all hardware in good position.   I have independently reviewed and interpreted these images and reports. 2 V cervical XR on 1/22/24 from [facility]- C4-6.

## 2024-06-15 NOTE — PHYSICAL EXAM
[Flexion] : flexion [Extension] : extension [Rotation to left] : rotation to left [Rotation to right] : rotation to right [4___] : right deltoid  4[unfilled]/5 [de-identified] : Constitutional:\par  - General Appearance:\par  Unremarkable\par  Body Habitus\par  Well Developed\par  Well Nourished\par  Body Habitus\par  No Deformities\par  Well Groomed\par  Ability To communicate:\par  Normal\par  Neurologic:\par  Global sensation is intact to upper and lower extremities. See examination of Neck and/or Spine\par  for exceptions.\par  Orientation to Time, Place and Person is: Normal\par  Mood And Affect is Normal\par  Skin:\par  - Head/Face, Right Upper/Lower Extremity, Left Upper/Lower Extremity: Normal\par  See Examination of Neck and/or Spine for exceptions\par  Cardiovascular:\par  Peripheral Cardiovascular System is Normal\par  Palpation of Lymph Nodes:\par  Normal Palpation of lymph nodes in: Axilla, Cervical, Inguinal\par  Abnormal Palpation of lymph nodes in: None  [FreeTextEntry3] : tremor in hands b/l.  [TWNoteComboBox7] : forward flexion 20 degrees [de-identified] : extension 0 degrees [de-identified] : left lateral rotation 25 degrees [TWNoteComboBox6] : right lateral rotation 45 degrees [] : clonus not sustained at ankle [de-identified] : shuffling gait  [de-identified] : mild ataxia [de-identified] : 3+ b/l reflex. L4,L5,L6 paresthesia's on RT
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
## 2024-06-15 NOTE — DISCUSSION/SUMMARY
[de-identified] : 59 y/o female s/p C3-4 ACDF above prior C4-6 ACDF (DOS: 5/30/24). Explained expected outcomes post op including reduction in pain, improvement in function and expected recovery timeframe. Patient was provided with a referral for cervical physical therapy to work on stretching, strengthening and range of motion. Patient was provided with a cervical home exercise program. She will be given a cervical foam collar today.  Patient remains temporarily and totally disabled from customary work at this time due to symptom severity. Patient will stay OOW. Note given.  The external bone growth stimulator was applied to the patient and the patient was instructed to use the stimulator daily for the next 6 months or until the fusion/non-union is healed. The stimulator provides increased vascularity to the area which it is placed and accelerated your body's own natural healing process. Pulsed electromagnetic fields increases osteoblast and osteoclast activity thus creating the optimal environment for bone growth. The patient is clear on the purpose, function, and instructions associated with the stimulator and will follow up in my office in 2 weeks (remember -58 or -25 on E/M).   Prior to appointment and during encounter with patient extensive medical records were reviewed including but not limited to, hospital records, out patient records, imaging results, and lab data. During this appointment the patient was examined, diagnoses were discussed and explained in a face to face manner. In addition extensive time was spent reviewing aforementioned diagnostic studies. Counseling including abnormal image results, differential diagnoses, treatment options, risk and benefits, lifestyle changes, current condition, and current medications was performed. Patient's comments, questions, and concerns were address and patient verbalized understanding. Based on this patient's presentation at our office, which is an orthopedic spine surgeon's office, this patient inherently / intrinsically has a risk, however minute, of developing issues such as Cauda equina syndrome, bowel and bladder changes, or progression of motor or neurological deficits such as paralysis which may be permanent.    I, Myriam Simental, attest that this documentation has been prepared under the direction and in the presence of provider Curtis Beltran MD.
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
## 2024-06-15 NOTE — DATA REVIEWED
[FreeTextEntry1] : On my interpretations of these images from [facility] in house on 06/12/2024: I have independently reviewed and interpreted these images. 2 V CERVICAL XR- C3/4 ACDF with all-in-one cage above prior C4-6 ACDF, all hardware in good position.   I have independently reviewed and interpreted these images and reports. 2 V cervical XR on 1/22/24 from [facility]- C4-6.

## 2024-06-15 NOTE — PHYSICAL EXAM
[Flexion] : flexion [Extension] : extension [Rotation to left] : rotation to left [Rotation to right] : rotation to right [4___] : right deltoid  4[unfilled]/5 [de-identified] : Constitutional:\par  - General Appearance:\par  Unremarkable\par  Body Habitus\par  Well Developed\par  Well Nourished\par  Body Habitus\par  No Deformities\par  Well Groomed\par  Ability To communicate:\par  Normal\par  Neurologic:\par  Global sensation is intact to upper and lower extremities. See examination of Neck and/or Spine\par  for exceptions.\par  Orientation to Time, Place and Person is: Normal\par  Mood And Affect is Normal\par  Skin:\par  - Head/Face, Right Upper/Lower Extremity, Left Upper/Lower Extremity: Normal\par  See Examination of Neck and/or Spine for exceptions\par  Cardiovascular:\par  Peripheral Cardiovascular System is Normal\par  Palpation of Lymph Nodes:\par  Normal Palpation of lymph nodes in: Axilla, Cervical, Inguinal\par  Abnormal Palpation of lymph nodes in: None  [FreeTextEntry3] : tremor in hands b/l.  [TWNoteComboBox7] : forward flexion 20 degrees [de-identified] : extension 0 degrees [de-identified] : left lateral rotation 25 degrees [TWNoteComboBox6] : right lateral rotation 45 degrees [] : clonus not sustained at ankle [de-identified] : shuffling gait  [de-identified] : mild ataxia [de-identified] : 3+ b/l reflex. L4,L5,L6 paresthesia's on RT

## 2024-06-15 NOTE — DISCUSSION/SUMMARY
[de-identified] : 59 y/o female s/p C3-4 ACDF above prior C4-6 ACDF (DOS: 5/30/24). Explained expected outcomes post op including reduction in pain, improvement in function and expected recovery timeframe. Patient was provided with a referral for cervical physical therapy to work on stretching, strengthening and range of motion. Patient was provided with a cervical home exercise program. She will be given a cervical foam collar today.  Patient remains temporarily and totally disabled from customary work at this time due to symptom severity. Patient will stay OOW. Note given.  The external bone growth stimulator was applied to the patient and the patient was instructed to use the stimulator daily for the next 6 months or until the fusion/non-union is healed. The stimulator provides increased vascularity to the area which it is placed and accelerated your body's own natural healing process. Pulsed electromagnetic fields increases osteoblast and osteoclast activity thus creating the optimal environment for bone growth. The patient is clear on the purpose, function, and instructions associated with the stimulator and will follow up in my office in 2 weeks (remember -58 or -25 on E/M).   Prior to appointment and during encounter with patient extensive medical records were reviewed including but not limited to, hospital records, out patient records, imaging results, and lab data. During this appointment the patient was examined, diagnoses were discussed and explained in a face to face manner. In addition extensive time was spent reviewing aforementioned diagnostic studies. Counseling including abnormal image results, differential diagnoses, treatment options, risk and benefits, lifestyle changes, current condition, and current medications was performed. Patient's comments, questions, and concerns were address and patient verbalized understanding. Based on this patient's presentation at our office, which is an orthopedic spine surgeon's office, this patient inherently / intrinsically has a risk, however minute, of developing issues such as Cauda equina syndrome, bowel and bladder changes, or progression of motor or neurological deficits such as paralysis which may be permanent.    I, Myriam Simental, attest that this documentation has been prepared under the direction and in the presence of provider Curtis Beltran MD.

## 2024-06-19 NOTE — RESULTS/DATA
Will be out by the weekend.   HYDROcodone-acetaminophen (Norco)  mg tablet [[id number]]   Pharmacy    [id number] - [place], [place] - [address]  [address] [facility] [address]  Phone: [phone]  Fax: [phone]      [FreeTextEntry1] : MRI ([place] 5/2019) cervical spine was personally reviewed with the patients in the office today. MRI reveals anterior cervical hardware in place C4-5 and C5-6. There is a left paracentral disc herniation at C3-4 resulting in mild to moderate left foraminal stenosis. At C5-6 there is a small left paracentral disc bulge resulting in mild left foraminal stenosis. At C6-7 there is a small broad based disc bulge.

## 2024-06-26 ENCOUNTER — APPOINTMENT (OUTPATIENT)
Dept: ORTHOPEDIC SURGERY | Facility: CLINIC | Age: 59
End: 2024-06-26

## 2024-06-28 ENCOUNTER — APPOINTMENT (OUTPATIENT)
Dept: ORTHOPEDIC SURGERY | Facility: CLINIC | Age: 59
End: 2024-06-28
Payer: OTHER MISCELLANEOUS

## 2024-06-28 VITALS — HEIGHT: 67 IN | BODY MASS INDEX: 31.39 KG/M2 | WEIGHT: 200 LBS

## 2024-06-28 DIAGNOSIS — Z78.9 OTHER SPECIFIED HEALTH STATUS: ICD-10-CM

## 2024-06-28 DIAGNOSIS — Z98.1 ARTHRODESIS STATUS: ICD-10-CM

## 2024-06-28 PROCEDURE — 99024 POSTOP FOLLOW-UP VISIT: CPT

## 2024-06-28 PROCEDURE — 72040 X-RAY EXAM NECK SPINE 2-3 VW: CPT

## 2024-06-30 PROBLEM — Z98.1 S/P CERVICAL SPINAL FUSION: Status: ACTIVE | Noted: 2019-08-02

## 2024-07-09 ENCOUNTER — OFFICE (OUTPATIENT)
Facility: LOCATION | Age: 59
Setting detail: OPHTHALMOLOGY
End: 2024-07-09
Payer: COMMERCIAL

## 2024-07-09 DIAGNOSIS — H35.033: ICD-10-CM

## 2024-07-09 DIAGNOSIS — H40.1131: ICD-10-CM

## 2024-07-09 DIAGNOSIS — H16.223: ICD-10-CM

## 2024-07-09 PROCEDURE — 99213 OFFICE O/P EST LOW 20 MIN: CPT | Performed by: OPHTHALMOLOGY

## 2024-07-09 PROCEDURE — 92133 CPTRZD OPH DX IMG PST SGM ON: CPT | Performed by: OPHTHALMOLOGY

## 2024-07-09 ASSESSMENT — CONFRONTATIONAL VISUAL FIELD TEST (CVF)
OD_FINDINGS: FULL
OS_FINDINGS: FULL

## 2024-07-10 RX ORDER — METHOCARBAMOL 750 MG/1
750 TABLET, FILM COATED ORAL 3 TIMES DAILY
Qty: 60 | Refills: 0 | Status: ACTIVE | COMMUNITY
Start: 2024-07-10 | End: 1900-01-01

## 2024-07-15 ENCOUNTER — NON-APPOINTMENT (OUTPATIENT)
Age: 59
End: 2024-07-15

## 2024-08-02 ENCOUNTER — APPOINTMENT (OUTPATIENT)
Dept: ORTHOPEDIC SURGERY | Facility: CLINIC | Age: 59
End: 2024-08-02
Payer: OTHER MISCELLANEOUS

## 2024-08-02 VITALS — HEIGHT: 67 IN | WEIGHT: 200 LBS | BODY MASS INDEX: 31.39 KG/M2

## 2024-08-02 DIAGNOSIS — Z98.1 ARTHRODESIS STATUS: ICD-10-CM

## 2024-08-02 PROCEDURE — 99024 POSTOP FOLLOW-UP VISIT: CPT

## 2024-08-02 PROCEDURE — 72040 X-RAY EXAM NECK SPINE 2-3 VW: CPT

## 2024-08-02 RX ORDER — DICLOFENAC SODIUM 1% 10 MG/G
1 GEL TOPICAL DAILY
Qty: 1 | Refills: 2 | Status: ACTIVE | COMMUNITY
Start: 2024-08-02 | End: 1900-01-01

## 2024-08-04 NOTE — DATA REVIEWED
[FreeTextEntry1] : On my interpretations of these images from [facility] [place] on 08/02/2024: I have independently reviewed and interpreted these images. cervical XR 2 V- previous ACDF C4-6 w/ recent ACDF of C3-4, all hardware in place.  On my interpretations of these images from [facility] [place] on 06/28/2024: I have independently reviewed and interpreted these images. CERVICAL 2V XR- previous ACDF C4-6 w/ recent ACDF of C3-4, all hardware in place  On my interpretations of these images from [facility] [place] on 06/12/2024: I have independently reviewed and interpreted these images. 2 V CERVICAL XR- C3/4 ACDF with all-in-one cage above prior C4-6 ACDF, all hardware in good position.   I have independently reviewed and interpreted these images and reports. 2 V cervical XR on 1/22/24 from [facility]- C4-6.

## 2024-08-04 NOTE — PHYSICAL EXAM
[Flexion] : flexion [Extension] : extension [Rotation to left] : rotation to left [Rotation to right] : rotation to right [Biceps 2+] : biceps 2+ [Triceps 2+] : triceps 2+ [Brachioradialis 2+] : brachioradialis 2+ [de-identified] : Constitutional:\par  - General Appearance:\par  Unremarkable\par  Body Habitus\par  Well Developed\par  Well Nourished\par  Body Habitus\par  No Deformities\par  Well Groomed\par  Ability To communicate:\par  Normal\par  Neurologic:\par  Global sensation is intact to upper and lower extremities. See examination of Neck and/or Spine\par  for exceptions.\par  Orientation to Time, Place and Person is: Normal\par  Mood And Affect is Normal\par  Skin:\par  - Head/Face, Right Upper/Lower Extremity, Left Upper/Lower Extremity: Normal\par  See Examination of Neck and/or Spine for exceptions\par  Cardiovascular:\par  Peripheral Cardiovascular System is Normal\par  Palpation of Lymph Nodes:\par  Normal Palpation of lymph nodes in: Axilla, Cervical, Inguinal\par  Abnormal Palpation of lymph nodes in: None  [] : negative Soto reflex [FreeTextEntry8] : L>R tenderness throughout.  [de-identified] : altered sensation in LUE but not necessarily paresthesia's.   [TWNoteComboBox7] : forward flexion 20 degrees [de-identified] : extension 0 degrees [de-identified] : left lateral rotation 25 degrees [TWNoteComboBox6] : right lateral rotation 45 degrees

## 2024-08-04 NOTE — DISCUSSION/SUMMARY
[de-identified] : 57 y/o female s/p C3-4 ACDF above prior C4-6 ACDF (DOS: 5/30/24). Explained expected outcomes post op including reduction in pain, improvement in function and expected recovery timeframe. Voltaren gel RX'd to aid in symptom control. She may slowly start increasing physical activity as best tolerated. Patient will continue with current physical therapy routine and incorporate activities taught into their daily life- renewal given. Continue treating with bone stimulator.   Patient remains temporarily and totally disabled from customary work at this time due to symptom severity. Patient will stay OOW. Note given.  Follow up in 1 month with expectations on returning to work.   Prior to appointment and during encounter with patient extensive medical records were reviewed including but not limited to, hospital records, out patient records, imaging results, and lab data. During this appointment the patient was examined, diagnoses were discussed and explained in a face to face manner. In addition extensive time was spent reviewing aforementioned diagnostic studies. Counseling including abnormal image results, differential diagnoses, treatment options, risk and benefits, lifestyle changes, current condition, and current medications was performed. Patient's comments, questions, and concerns were address and patient verbalized understanding. Based on this patient's presentation at our office, which is an orthopedic spine surgeon's office, this patient inherently / intrinsically has a risk, however minute, of developing issues such as Cauda equina syndrome, bowel and bladder changes, or progression of motor or neurological deficits such as paralysis which may be permanent.   I, Milady Mckeon, attest that this documentation has been prepared under the direction and in the presence of provider Curtis Beltran MD.

## 2024-08-04 NOTE — DISCUSSION/SUMMARY
[de-identified] : 57 y/o female s/p C3-4 ACDF above prior C4-6 ACDF (DOS: 5/30/24). Explained expected outcomes post op including reduction in pain, improvement in function and expected recovery timeframe. Voltaren gel RX'd to aid in symptom control. She may slowly start increasing physical activity as best tolerated. Patient will continue with current physical therapy routine and incorporate activities taught into their daily life- renewal given. Continue treating with bone stimulator.   Patient remains temporarily and totally disabled from customary work at this time due to symptom severity. Patient will stay OOW. Note given.  Follow up in 1 month with expectations on returning to work.   Prior to appointment and during encounter with patient extensive medical records were reviewed including but not limited to, hospital records, out patient records, imaging results, and lab data. During this appointment the patient was examined, diagnoses were discussed and explained in a face to face manner. In addition extensive time was spent reviewing aforementioned diagnostic studies. Counseling including abnormal image results, differential diagnoses, treatment options, risk and benefits, lifestyle changes, current condition, and current medications was performed. Patient's comments, questions, and concerns were address and patient verbalized understanding. Based on this patient's presentation at our office, which is an orthopedic spine surgeon's office, this patient inherently / intrinsically has a risk, however minute, of developing issues such as Cauda equina syndrome, bowel and bladder changes, or progression of motor or neurological deficits such as paralysis which may be permanent.   I, Milady Mckeon, attest that this documentation has been prepared under the direction and in the presence of provider Curtis Beltran MD.

## 2024-08-04 NOTE — DATA REVIEWED
Established Patient        Chief Complaint:   Chief Complaint   Patient presents with   • Follow-up        William Maria is a 44 y.o. male    History of Present Illness:   Here today for recently found abnormalities on laboratory evaluation.  Findings consistent with significant iron deficiency, although ferritin level was elevated.  His platelet levels were elevated additionally.  He denies any new medications or nutritional supplements.  He maintains a balanced dietary intake, he reports good hydration, predominantly drinking water.  Denies any BRB/BTS.  He has never undergone any form of endoscopy, will upper or lower.  He denies any active chest pain, although has had some noticeable malaise/fatigue, which he reports has improved over the course of the last couple weeks.  He denies any fever, chills or night sweats.    Patient does report significant abnormalities to the skin, including erythematous papules that develops to his upper and lower extremities, predominantly to his lower extremities.  They have significantly improved over the course of the last several weeks.    Patient does have laboratory results for approximately 3 months ago, which were relatively consistent with microcytic anemia.  His thyroid studies have been normal.  Normal cholesterol panel.  He denies any hematuria or dysuria.  Denies any aspiration or dysphagia.    Subjective     The following portions of the patient's history were reviewed and updated as appropriate: allergies, current medications, past family history, past medical history, past social history, past surgical history and problem list.    No Known Allergies    Review of Systems  1. Constitutional: Negative for fever. Negative for chills, diaphoresis, fatigue and unexpected weight change.   2. HENT: No dysphagia; no changes to vision/hearing/smell/taste; no epistaxis  3. Eyes: Negative for redness and visual disturbance.   4. Respiratory: negative for  [FreeTextEntry1] : On my interpretations of these images from [facility] in [place] on 08/02/2024: I have independently reviewed and interpreted these images. cervical XR 2 V- previous ACDF C4-6 w/ recent ACDF of C3-4, all hardware in place.  On my interpretations of these images from [facility] in [place] on 06/28/2024: I have independently reviewed and interpreted these images. CERVICAL 2V XR- previous ACDF C4-6 w/ recent ACDF of C3-4, all hardware in place  On my interpretations of these images from [facility] in [place] on 06/12/2024: I have independently reviewed and interpreted these images. 2 V CERVICAL XR- C3/4 ACDF with all-in-one cage above prior C4-6 ACDF, all hardware in good position.   I have independently reviewed and interpreted these images and reports. 2 V cervical XR on 1/22/24 from zp- C4-6.       "shortness of breath. Negative for chest pain . Negative for cough and chest tightness.   5. Cardiovascular: Negative for chest pain and palpitations.   6. Gastrointestinal: Negative for abdominal distention, abdominal pain and blood in stool.   7. Endocrine: Negative for cold intolerance and heat intolerance.   8. Genitourinary: Negative for difficulty urinating, dysuria and frequency.   9. Musculoskeletal: Negative for arthralgias, back pain and myalgias.   10. Skin: As per above.  11. Neurological: Negative for syncope, weakness and headaches.   12. Hematological: Negative for adenopathy. Does not bruise/bleed easily.   13. Psychiatric/Behavioral: Negative for confusion. The patient is not nervous/anxious.    Objective     Physical Exam   Vital Signs: /72   Pulse 76   Temp 97.8 °F (36.6 °C)   Resp 15   Ht 188 cm (74\")   Wt 102 kg (224 lb)   SpO2 96%   BMI 28.76 kg/m²   Class 3 Severe Obesity (BMI >=40). Obesity-related health conditions include the following: none. Obesity is unchanged. BMI is is above average; no BMI management plan is appropriate. We discussed portion control and increasing exercise.    General Appearance: alert, oriented x 3, no acute distress.  Skin: warm and dry.  Circular areas of hypermelanotic color when compared to surrounding tissue of bilateral lower extremities, involving both thighs and lower legs.  Less prominent areas noted to his upper extremities.  HEENT: Atraumatic.  pupils round and reactive to light and accommodation, oral mucosa pink and moist.  Nares patent without epistaxis.  External auditory canals are patent tympanic membranes intact.  Neck: supple, no JVD, trachea midline.  No thyromegaly  Lungs: CTA, unlabored breathing effort.  Heart: RRR, normal S1 and S2, no S3, no rub.  Abdomen: soft, non-tender, no palpable bladder, present bowel sounds to auscultation ×4.  No guarding or rigidity.  Extremities: no clubbing, cyanosis or edema.  Good range of motion " actively and passively.  Symmetric muscle strength and development  Neuro: normal speech and mental status.  Cranial nerves II through XII intact.  No anosmia. DTR 2+; proprioception intact.  No focal motor/sensory deficits.    Assessment and Plan      Assessment/Plan:   Diagnoses and all orders for this visit:    1. Microcytic anemia (Primary)  -     Ambulatory Referral to Gastroenterology  -     Antistreptolysin O screen  -     Rheumatoid factor  -     C-reactive protein  -     ETHEL  -     Comprehensive Metabolic Panel  -     US Abdomen Complete    2. Iron deficiency anemia, unspecified iron deficiency anemia type  -     Ambulatory Referral to Gastroenterology  -     Antistreptolysin O screen  -     Rheumatoid factor  -     C-reactive protein  -     ETHEL  -     Comprehensive Metabolic Panel  -     US Abdomen Complete    3. Thrombocytosis  -     Ambulatory Referral to Gastroenterology  -     Antistreptolysin O screen  -     Rheumatoid factor  -     C-reactive protein  -     ETHEL  -     Comprehensive Metabolic Panel  -     US Abdomen Complete      Am unsure as to the etiology of patient's rash, although his laboratory evaluations do demonstrate findings of iron deficiency anemia, microcytic in fact, however I do feel his elevated ferritin is an acute phase reactant.  He had a noted elevated white blood cell count, mild, 13 K.  Normal kidney function.    Due to his malaise/fatigue, and numerous acute phase reactants, I recommended rheumatological lab evaluation, initial, with extension if needed.    I have also ordered an abdominal ultrasound.    Referral has been placed to gastroenterology for probable upper/lower endoscopy needed.    Referral for allergy testing if indicated after review of all other studies.      Discussion Summary:    I spent 30 minutes caring for William on this date of service. This time includes time spent by me in the following activities:preparing for the visit, reviewing tests, performing a  medically appropriate examination and/or evaluation , counseling and educating the patient/family/caregiver, ordering medications, tests, or procedures, referring and communicating with other health care professionals , documenting information in the medical record and care coordination    Discussed plan of care in detail with pt today; pt verb understanding and agrees.  Follow up:  No follow-ups on file.     There are no Patient Instructions on file for this visit.    Alex Flores DO  06/02/22  15:28 EDT          Please note that portions of this note may have been completed with a voice recognition program. Efforts were made to edit the dictations, but occasionally words are mistranscribed.
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
## 2024-08-04 NOTE — PHYSICAL EXAM
[Flexion] : flexion [Extension] : extension [Rotation to left] : rotation to left [Rotation to right] : rotation to right [Biceps 2+] : biceps 2+ [Triceps 2+] : triceps 2+ [Brachioradialis 2+] : brachioradialis 2+ [de-identified] : Constitutional:\par  - General Appearance:\par  Unremarkable\par  Body Habitus\par  Well Developed\par  Well Nourished\par  Body Habitus\par  No Deformities\par  Well Groomed\par  Ability To communicate:\par  Normal\par  Neurologic:\par  Global sensation is intact to upper and lower extremities. See examination of Neck and/or Spine\par  for exceptions.\par  Orientation to Time, Place and Person is: Normal\par  Mood And Affect is Normal\par  Skin:\par  - Head/Face, Right Upper/Lower Extremity, Left Upper/Lower Extremity: Normal\par  See Examination of Neck and/or Spine for exceptions\par  Cardiovascular:\par  Peripheral Cardiovascular System is Normal\par  Palpation of Lymph Nodes:\par  Normal Palpation of lymph nodes in: Axilla, Cervical, Inguinal\par  Abnormal Palpation of lymph nodes in: None  [FreeTextEntry8] : L>R tenderness throughout.  [] : negative Soto reflex [de-identified] : altered sensation in LUE but not necessarily paresthesia's.   [TWNoteComboBox7] : forward flexion 20 degrees [de-identified] : extension 0 degrees [de-identified] : left lateral rotation 25 degrees [TWNoteComboBox6] : right lateral rotation 45 degrees
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
## 2024-08-07 ENCOUNTER — OUTPATIENT (OUTPATIENT)
Dept: OUTPATIENT SERVICES | Facility: HOSPITAL | Age: 59
LOS: 1 days | End: 2024-08-07
Payer: COMMERCIAL

## 2024-08-07 DIAGNOSIS — Z98.890 OTHER SPECIFIED POSTPROCEDURAL STATES: Chronic | ICD-10-CM

## 2024-08-07 DIAGNOSIS — G47.33 OBSTRUCTIVE SLEEP APNEA (ADULT) (PEDIATRIC): ICD-10-CM

## 2024-08-07 DIAGNOSIS — Z90.89 ACQUIRED ABSENCE OF OTHER ORGANS: Chronic | ICD-10-CM

## 2024-08-07 DIAGNOSIS — Z90.710 ACQUIRED ABSENCE OF BOTH CERVIX AND UTERUS: Chronic | ICD-10-CM

## 2024-08-07 DIAGNOSIS — Z98.1 ARTHRODESIS STATUS: Chronic | ICD-10-CM

## 2024-08-07 PROCEDURE — 95800 SLP STDY UNATTENDED: CPT

## 2024-08-07 PROCEDURE — 95800 SLP STDY UNATTENDED: CPT | Mod: 26

## 2024-08-22 PROCEDURE — 83735 ASSAY OF MAGNESIUM: CPT

## 2024-08-22 PROCEDURE — 76000 FLUOROSCOPY <1 HR PHYS/QHP: CPT

## 2024-08-22 PROCEDURE — 85027 COMPLETE CBC AUTOMATED: CPT

## 2024-08-22 PROCEDURE — C1713: CPT

## 2024-08-22 PROCEDURE — 36415 COLL VENOUS BLD VENIPUNCTURE: CPT

## 2024-08-22 PROCEDURE — C9399: CPT

## 2024-08-22 PROCEDURE — 84100 ASSAY OF PHOSPHORUS: CPT

## 2024-08-22 PROCEDURE — 94640 AIRWAY INHALATION TREATMENT: CPT

## 2024-08-22 PROCEDURE — 80048 BASIC METABOLIC PNL TOTAL CA: CPT

## 2024-08-22 PROCEDURE — C1889: CPT

## 2024-09-06 ENCOUNTER — APPOINTMENT (OUTPATIENT)
Dept: ORTHOPEDIC SURGERY | Facility: CLINIC | Age: 59
End: 2024-09-06
Payer: OTHER MISCELLANEOUS

## 2024-09-06 VITALS — BODY MASS INDEX: 31.39 KG/M2 | HEIGHT: 67 IN | WEIGHT: 200 LBS

## 2024-09-06 DIAGNOSIS — M54.12 RADICULOPATHY, CERVICAL REGION: ICD-10-CM

## 2024-09-06 DIAGNOSIS — Z98.1 ARTHRODESIS STATUS: ICD-10-CM

## 2024-09-06 DIAGNOSIS — M48.02 SPINAL STENOSIS, CERVICAL REGION: ICD-10-CM

## 2024-09-06 DIAGNOSIS — M54.2 CERVICALGIA: ICD-10-CM

## 2024-09-06 DIAGNOSIS — M50.20 OTHER CERVICAL DISC DISPLACEMENT, UNSPECIFIED CERVICAL REGION: ICD-10-CM

## 2024-09-06 PROCEDURE — 72040 X-RAY EXAM NECK SPINE 2-3 VW: CPT

## 2024-09-06 PROCEDURE — 99213 OFFICE O/P EST LOW 20 MIN: CPT

## 2024-09-06 RX ORDER — METHYLPREDNISOLONE 4 MG/1
4 TABLET ORAL
Qty: 1 | Refills: 0 | Status: ACTIVE | COMMUNITY
Start: 2024-09-06 | End: 1900-01-01

## 2024-09-07 NOTE — PHYSICAL EXAM
[Flexion] : flexion [Extension] : extension [Rotation to left] : rotation to left [Rotation to right] : rotation to right [Biceps 2+] : biceps 2+ [Triceps 2+] : triceps 2+ [Brachioradialis 2+] : brachioradialis 2+ [de-identified] : Constitutional:\par  - General Appearance:\par  Unremarkable\par  Body Habitus\par  Well Developed\par  Well Nourished\par  Body Habitus\par  No Deformities\par  Well Groomed\par  Ability To communicate:\par  Normal\par  Neurologic:\par  Global sensation is intact to upper and lower extremities. See examination of Neck and/or Spine\par  for exceptions.\par  Orientation to Time, Place and Person is: Normal\par  Mood And Affect is Normal\par  Skin:\par  - Head/Face, Right Upper/Lower Extremity, Left Upper/Lower Extremity: Normal\par  See Examination of Neck and/or Spine for exceptions\par  Cardiovascular:\par  Peripheral Cardiovascular System is Normal\par  Palpation of Lymph Nodes:\par  Normal Palpation of lymph nodes in: Axilla, Cervical, Inguinal\par  Abnormal Palpation of lymph nodes in: None  [] : negative facet loading [FreeTextEntry8] : L>R tenderness throughout.  [de-identified] : altered sensation in LUE but not necessarily paresthesia's.   [TWNoteComboBox7] : forward flexion 20 degrees [de-identified] : extension 0 degrees [de-identified] : left lateral rotation 25 degrees [TWNoteComboBox6] : right lateral rotation 45 degrees
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
## 2024-09-07 NOTE — DISCUSSION/SUMMARY
[de-identified] : 59 y/o female s/p C3-4 ACDF above prior C4-6 ACDF (DOS: 5/30/24). Explained expected outcomes post op including reduction in pain, improvement in function and expected recovery timeframe. MDP RX'd in case of flare up as pt has an upcoming trip. She may slowly start increasing physical activity as best tolerated. Patient will continue with current physical therapy routine and incorporate activities taught into their daily life- renewal given. Continue treating with bone stimulator.   Patient remains temporarily and totally disabled from customary work at this time due to symptom severity. Patient will stay OOW. Note given.  Follow up in 6-8 weeks.   Prior to appointment and during encounter with patient extensive medical records were reviewed including but not limited to, hospital records, out patient records, imaging results, and lab data. During this appointment the patient was examined, diagnoses were discussed and explained in a face to face manner. In addition extensive time was spent reviewing aforementioned diagnostic studies. Counseling including abnormal image results, differential diagnoses, treatment options, risk and benefits, lifestyle changes, current condition, and current medications was performed. Patient's comments, questions, and concerns were address and patient verbalized understanding. Based on this patient's presentation at our office, which is an orthopedic spine surgeon's office, this patient inherently / intrinsically has a risk, however minute, of developing issues such as Cauda equina syndrome, bowel and bladder changes, or progression of motor or neurological deficits such as paralysis which may be permanent.   I, Milady Mckeon, attest that this documentation has been prepared under the direction and in the presence of provider Curtis Beltran MD.
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
## 2024-09-07 NOTE — DATA REVIEWED
[FreeTextEntry1] : On my interpretations of these images from [facility] on 09/06/2024: I have independently reviewed and interpreted these images. Cervical XR 2 V- previous ACDF C4-6 w/ recent ACDF of C3-4, all hardware in good position, no signs of loosening.   On my interpretations of these images from [facility] on 08/02/2024: I have independently reviewed and interpreted these images. cervical XR 2 V- previous ACDF C4-6 w/ recent ACDF of C3-4, all hardware in place.  On my interpretations of these images from [facility] on 06/28/2024: I have independently reviewed and interpreted these images. CERVICAL 2V XR- previous ACDF C4-6 w/ recent ACDF of C3-4, all hardware in place  On my interpretations of these images from [facility] on 06/12/2024: I have independently reviewed and interpreted these images. 2 V CERVICAL XR- C3/4 ACDF with all-in-one cage above prior C4-6 ACDF, all hardware in good position.   I have independently reviewed and interpreted these images and reports. 2 V cervical XR on 1/22/24 from [facility]- C4-6.

## 2024-09-07 NOTE — DATA REVIEWED
[FreeTextEntry1] : On my interpretations of these images from [facility] on 09/06/2024: I have independently reviewed and interpreted these images. Cervical XR 2 V- previous ACDF C4-6 w/ recent ACDF of C3-4, all hardware in good position, no signs of loosening.   On my interpretations of these images from [facility] on 08/02/2024: I have independently reviewed and interpreted these images. cervical XR 2 V- previous ACDF C4-6 w/ recent ACDF of C3-4, all hardware in place.  On my interpretations of these images from [facility] on 06/28/2024: I have independently reviewed and interpreted these images. CERVICAL 2V XR- previous ACDF C4-6 w/ recent ACDF of C3-4, all hardware in place  On my interpretations of these images from [facility] on 06/12/2024: I have independently reviewed and interpreted these images. 2 V CERVICAL XR- C3/4 ACDF with all-in-one cage above prior C4-6 ACDF, all hardware in good position.   I have independently reviewed and interpreted these images and reports. 2 V cervical XR on 1/22/24 from [facility]- C4-6.

## 2024-09-07 NOTE — PHYSICAL EXAM
[Flexion] : flexion [Extension] : extension [Rotation to left] : rotation to left [Rotation to right] : rotation to right [Biceps 2+] : biceps 2+ [Triceps 2+] : triceps 2+ [Brachioradialis 2+] : brachioradialis 2+ [de-identified] : Constitutional:\par  - General Appearance:\par  Unremarkable\par  Body Habitus\par  Well Developed\par  Well Nourished\par  Body Habitus\par  No Deformities\par  Well Groomed\par  Ability To communicate:\par  Normal\par  Neurologic:\par  Global sensation is intact to upper and lower extremities. See examination of Neck and/or Spine\par  for exceptions.\par  Orientation to Time, Place and Person is: Normal\par  Mood And Affect is Normal\par  Skin:\par  - Head/Face, Right Upper/Lower Extremity, Left Upper/Lower Extremity: Normal\par  See Examination of Neck and/or Spine for exceptions\par  Cardiovascular:\par  Peripheral Cardiovascular System is Normal\par  Palpation of Lymph Nodes:\par  Normal Palpation of lymph nodes in: Axilla, Cervical, Inguinal\par  Abnormal Palpation of lymph nodes in: None  [] : negative Soto reflex [FreeTextEntry8] : L>R tenderness throughout.  [de-identified] : altered sensation in LUE but not necessarily paresthesia's.   [TWNoteComboBox7] : forward flexion 20 degrees [de-identified] : extension 0 degrees [de-identified] : left lateral rotation 25 degrees [TWNoteComboBox6] : right lateral rotation 45 degrees

## 2024-09-07 NOTE — DISCUSSION/SUMMARY
[de-identified] : 57 y/o female s/p C3-4 ACDF above prior C4-6 ACDF (DOS: 5/30/24). Explained expected outcomes post op including reduction in pain, improvement in function and expected recovery timeframe. MDP RX'd in case of flare up as pt has an upcoming trip. She may slowly start increasing physical activity as best tolerated. Patient will continue with current physical therapy routine and incorporate activities taught into their daily life- renewal given. Continue treating with bone stimulator.   Patient remains temporarily and totally disabled from customary work at this time due to symptom severity. Patient will stay OOW. Note given.  Follow up in 6-8 weeks.   Prior to appointment and during encounter with patient extensive medical records were reviewed including but not limited to, hospital records, out patient records, imaging results, and lab data. During this appointment the patient was examined, diagnoses were discussed and explained in a face to face manner. In addition extensive time was spent reviewing aforementioned diagnostic studies. Counseling including abnormal image results, differential diagnoses, treatment options, risk and benefits, lifestyle changes, current condition, and current medications was performed. Patient's comments, questions, and concerns were address and patient verbalized understanding. Based on this patient's presentation at our office, which is an orthopedic spine surgeon's office, this patient inherently / intrinsically has a risk, however minute, of developing issues such as Cauda equina syndrome, bowel and bladder changes, or progression of motor or neurological deficits such as paralysis which may be permanent.   I, Milady Mckeon, attest that this documentation has been prepared under the direction and in the presence of provider Curtis Beltran MD.

## 2024-10-04 ENCOUNTER — APPOINTMENT (OUTPATIENT)
Dept: ORTHOPEDIC SURGERY | Facility: CLINIC | Age: 59
End: 2024-10-04

## 2024-10-04 VITALS — WEIGHT: 199 LBS | BODY MASS INDEX: 31.23 KG/M2 | HEIGHT: 67 IN

## 2024-10-04 DIAGNOSIS — Z98.1 ARTHRODESIS STATUS: ICD-10-CM

## 2024-10-04 PROCEDURE — 99213 OFFICE O/P EST LOW 20 MIN: CPT

## 2024-10-04 PROCEDURE — 72050 X-RAY EXAM NECK SPINE 4/5VWS: CPT
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
## 2024-10-05 NOTE — PHYSICAL EXAM
[Flexion] : flexion [Extension] : extension [Rotation to left] : rotation to left [Rotation to right] : rotation to right [Biceps 2+] : biceps 2+ [Triceps 2+] : triceps 2+ [Brachioradialis 2+] : brachioradialis 2+ [de-identified] : Constitutional:\par  - General Appearance:\par  Unremarkable\par  Body Habitus\par  Well Developed\par  Well Nourished\par  Body Habitus\par  No Deformities\par  Well Groomed\par  Ability To communicate:\par  Normal\par  Neurologic:\par  Global sensation is intact to upper and lower extremities. See examination of Neck and/or Spine\par  for exceptions.\par  Orientation to Time, Place and Person is: Normal\par  Mood And Affect is Normal\par  Skin:\par  - Head/Face, Right Upper/Lower Extremity, Left Upper/Lower Extremity: Normal\par  See Examination of Neck and/or Spine for exceptions\par  Cardiovascular:\par  Peripheral Cardiovascular System is Normal\par  Palpation of Lymph Nodes:\par  Normal Palpation of lymph nodes in: Axilla, Cervical, Inguinal\par  Abnormal Palpation of lymph nodes in: None  [] : negative Soto reflex [TWNoteComboBox7] : forward flexion 20 degrees [de-identified] : altered sensation in LUE but not necessarily paresthesia's.   [de-identified] : extension 0 degrees [de-identified] : left lateral rotation 25 degrees [TWNoteComboBox6] : right lateral rotation 45 degrees

## 2024-10-05 NOTE — DATA REVIEWED
[FreeTextEntry1] : On my interpretations of these images from [facility] on 10/04/2024: I have independently reviewed and interpreted these images. 4 V cervical XR- C3-4 ACDF hardware well fixed w/o signs of loosening, no instability on flex/ex.   On my interpretations of these images from [facility] on 09/06/2024: I have independently reviewed and interpreted these images. Cervical XR 2 V- previous ACDF C4-6 w/ recent ACDF of C3-4, all hardware in good position, no signs of loosening.   On my interpretations of these images from [facility] on 08/02/2024: I have independently reviewed and interpreted these images. cervical XR 2 V- previous ACDF C4-6 w/ recent ACDF of C3-4, all hardware in place.  On my interpretations of these images from [facility] on 06/28/2024: I have independently reviewed and interpreted these images. CERVICAL 2V XR- previous ACDF C4-6 w/ recent ACDF of C3-4, all hardware in place  On my interpretations of these images from [facility] on 06/12/2024: I have independently reviewed and interpreted these images. 2 V CERVICAL XR- C3/4 ACDF with all-in-one cage above prior C4-6 ACDF, all hardware in good position.   I have independently reviewed and interpreted these images and reports. 2 V cervical XR on 1/22/24 from [facility]- C4-6.

## 2024-10-05 NOTE — DATA REVIEWED
[FreeTextEntry1] : On my interpretations of these images from [facility] on 10/04/2024: I have independently reviewed and interpreted these images. 4 V cervical XR- C3-4 ACDF hardware well fixed w/o signs of loosening, no instability on flex/ex.   On my interpretations of these images from [facility] on 09/06/2024: I have independently reviewed and interpreted these images. Cervical XR 2 V- previous ACDF C4-6 w/ recent ACDF of C3-4, all hardware in good position, no signs of loosening.   On my interpretations of these images from [facility] on 08/02/2024: I have independently reviewed and interpreted these images. cervical XR 2 V- previous ACDF C4-6 w/ recent ACDF of C3-4, all hardware in place.  On my interpretations of these images from [facility] on 06/28/2024: I have independently reviewed and interpreted these images. CERVICAL 2V XR- previous ACDF C4-6 w/ recent ACDF of C3-4, all hardware in place  On my interpretations of these images from [facility] on 06/12/2024: I have independently reviewed and interpreted these images. 2 V CERVICAL XR- C3/4 ACDF with all-in-one cage above prior C4-6 ACDF, all hardware in good position.   I have independently reviewed and interpreted these images and reports. 2 V cervical XR on 1/22/24 from [facility]- C4-6.

## 2024-10-05 NOTE — PHYSICAL EXAM
[Flexion] : flexion [Extension] : extension [Rotation to left] : rotation to left [Rotation to right] : rotation to right [Biceps 2+] : biceps 2+ [Triceps 2+] : triceps 2+ [Brachioradialis 2+] : brachioradialis 2+ [de-identified] : Constitutional:\par  - General Appearance:\par  Unremarkable\par  Body Habitus\par  Well Developed\par  Well Nourished\par  Body Habitus\par  No Deformities\par  Well Groomed\par  Ability To communicate:\par  Normal\par  Neurologic:\par  Global sensation is intact to upper and lower extremities. See examination of Neck and/or Spine\par  for exceptions.\par  Orientation to Time, Place and Person is: Normal\par  Mood And Affect is Normal\par  Skin:\par  - Head/Face, Right Upper/Lower Extremity, Left Upper/Lower Extremity: Normal\par  See Examination of Neck and/or Spine for exceptions\par  Cardiovascular:\par  Peripheral Cardiovascular System is Normal\par  Palpation of Lymph Nodes:\par  Normal Palpation of lymph nodes in: Axilla, Cervical, Inguinal\par  Abnormal Palpation of lymph nodes in: None  [] : negative Soto reflex [de-identified] : altered sensation in LUE but not necessarily paresthesia's.   [TWNoteComboBox7] : forward flexion 20 degrees [de-identified] : extension 0 degrees [de-identified] : left lateral rotation 25 degrees [TWNoteComboBox6] : right lateral rotation 45 degrees
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
## 2024-10-05 NOTE — DISCUSSION/SUMMARY
[de-identified] : 57 y/o female s/p C3-4 ACDF above prior C4-6 ACDF (DOS: 5/30/24). Explained expected outcomes post op including reduction in pain, improvement in function and expected recovery timeframe.   XRs reviewed & discussed with patient today- all hardware in good position. Will continue to monitor neck and arm pains. Patient will continue with current physical therapy routine, renewal given due to patient's overall reduction in pain and mobility continuing cervical physical therapy will be helpful at this time.   Patient remains temporarily and totally disabled from customary work at this time due to symptom severity. Patient will stay OOW. Note given.   F/U in 6-8 WKS.    Prior to appointment and during encounter with patient extensive medical records were reviewed including but not limited to, hospital records, out patient records, imaging results, and lab data. During this appointment the patient was examined, diagnoses were discussed and explained in a face to face manner. In addition extensive time was spent reviewing aforementioned diagnostic studies. Counseling including abnormal image results, differential diagnoses, treatment options, risk and benefits, lifestyle changes, current condition, and current medications was performed. Patient's comments, questions, and concerns were address and patient verbalized understanding. Based on this patient's presentation at our office, which is an orthopedic spine surgeon's office, this patient inherently / intrinsically has a risk, however minute, of developing issues such as Cauda equina syndrome, bowel and bladder changes, or progression of motor or neurological deficits such as paralysis which may be permanent.   I, Milady Mckeon, attest that this documentation has been prepared under the direction and in the presence of provider Curtis Beltran MD.

## 2024-10-05 NOTE — DISCUSSION/SUMMARY
[de-identified] : 57 y/o female s/p C3-4 ACDF above prior C4-6 ACDF (DOS: 5/30/24). Explained expected outcomes post op including reduction in pain, improvement in function and expected recovery timeframe.   XRs reviewed & discussed with patient today- all hardware in good position. Will continue to monitor neck and arm pains. Patient will continue with current physical therapy routine, renewal given due to patient's overall reduction in pain and mobility continuing cervical physical therapy will be helpful at this time.   Patient remains temporarily and totally disabled from customary work at this time due to symptom severity. Patient will stay OOW. Note given.   F/U in 6-8 WKS.    Prior to appointment and during encounter with patient extensive medical records were reviewed including but not limited to, hospital records, out patient records, imaging results, and lab data. During this appointment the patient was examined, diagnoses were discussed and explained in a face to face manner. In addition extensive time was spent reviewing aforementioned diagnostic studies. Counseling including abnormal image results, differential diagnoses, treatment options, risk and benefits, lifestyle changes, current condition, and current medications was performed. Patient's comments, questions, and concerns were address and patient verbalized understanding. Based on this patient's presentation at our office, which is an orthopedic spine surgeon's office, this patient inherently / intrinsically has a risk, however minute, of developing issues such as Cauda equina syndrome, bowel and bladder changes, or progression of motor or neurological deficits such as paralysis which may be permanent.   I, Milady Mckeon, attest that this documentation has been prepared under the direction and in the presence of provider Curtis Beltran MD.

## 2024-11-01 ENCOUNTER — APPOINTMENT (OUTPATIENT)
Dept: ORTHOPEDIC SURGERY | Facility: CLINIC | Age: 59
End: 2024-11-01

## 2024-11-15 ENCOUNTER — APPOINTMENT (OUTPATIENT)
Dept: ORTHOPEDIC SURGERY | Facility: CLINIC | Age: 59
End: 2024-11-15
Payer: OTHER MISCELLANEOUS

## 2024-11-15 VITALS — BODY MASS INDEX: 31.23 KG/M2 | WEIGHT: 199 LBS | HEIGHT: 67 IN

## 2024-11-15 DIAGNOSIS — Z98.1 ARTHRODESIS STATUS: ICD-10-CM

## 2024-11-15 PROCEDURE — 72040 X-RAY EXAM NECK SPINE 2-3 VW: CPT

## 2024-11-15 PROCEDURE — 99213 OFFICE O/P EST LOW 20 MIN: CPT

## 2024-11-20 ENCOUNTER — APPOINTMENT (OUTPATIENT)
Dept: NEUROSURGERY | Facility: CLINIC | Age: 59
End: 2024-11-20

## 2024-12-30 ENCOUNTER — RX ONLY (RX ONLY)
Age: 59
End: 2024-12-30

## 2024-12-30 ENCOUNTER — OFFICE (OUTPATIENT)
Facility: LOCATION | Age: 59
Setting detail: OPHTHALMOLOGY
End: 2024-12-30
Payer: COMMERCIAL

## 2024-12-30 DIAGNOSIS — H31.29: ICD-10-CM

## 2024-12-30 DIAGNOSIS — H40.1131: ICD-10-CM

## 2024-12-30 DIAGNOSIS — H43.393: ICD-10-CM

## 2024-12-30 DIAGNOSIS — H35.443: ICD-10-CM

## 2024-12-30 DIAGNOSIS — H16.223: ICD-10-CM

## 2024-12-30 PROCEDURE — 92014 COMPRE OPH EXAM EST PT 1/>: CPT | Performed by: OPHTHALMOLOGY

## 2024-12-30 PROCEDURE — 92250 FUNDUS PHOTOGRAPHY W/I&R: CPT | Performed by: OPHTHALMOLOGY

## 2024-12-30 ASSESSMENT — CONFRONTATIONAL VISUAL FIELD TEST (CVF)
OS_FINDINGS: FULL
OD_FINDINGS: FULL

## 2024-12-30 ASSESSMENT — SUPERFICIAL PUNCTATE KERATITIS (SPK)
OS_SPK: 1+
OD_SPK: 1+

## 2024-12-30 ASSESSMENT — TONOMETRY
OS_IOP_MMHG: 18
OD_IOP_MMHG: 18

## 2025-01-03 ASSESSMENT — KERATOMETRY
OS_K2POWER_DIOPTERS: 42.50
OS_K1POWER_DIOPTERS: 42.50
OD_K1POWER_DIOPTERS: 42.25
OD_AXISANGLE_DEGREES: 132
OD_K2POWER_DIOPTERS: 42.75

## 2025-01-03 ASSESSMENT — REFRACTION_CURRENTRX
OS_CYLINDER: 0.00
OD_CYLINDER: +0.50
OD_AXIS: 150
OS_OVR_VA: 20/
OS_SPHERE: +0.50
OD_OVR_VA: 20/
OD_SPHERE: PLANO

## 2025-01-03 ASSESSMENT — VISUAL ACUITY
OS_BCVA: 20/30
OD_BCVA: 20/25

## 2025-01-03 ASSESSMENT — REFRACTION_AUTOREFRACTION
OD_AXIS: 154
OD_SPHERE: PLANO
OS_SPHERE: +0.25
OS_CYLINDER: SPHERE
OD_CYLINDER: +0.75

## 2025-01-15 ENCOUNTER — APPOINTMENT (OUTPATIENT)
Dept: ORTHOPEDIC SURGERY | Facility: CLINIC | Age: 60
End: 2025-01-15
Payer: OTHER MISCELLANEOUS

## 2025-01-15 VITALS — BODY MASS INDEX: 31.23 KG/M2 | HEIGHT: 67 IN | WEIGHT: 199 LBS

## 2025-01-15 DIAGNOSIS — Z98.1 ARTHRODESIS STATUS: ICD-10-CM

## 2025-01-15 PROCEDURE — 72040 X-RAY EXAM NECK SPINE 2-3 VW: CPT

## 2025-01-15 PROCEDURE — 99213 OFFICE O/P EST LOW 20 MIN: CPT

## 2025-03-12 ENCOUNTER — APPOINTMENT (OUTPATIENT)
Dept: ORTHOPEDIC SURGERY | Facility: CLINIC | Age: 60
End: 2025-03-12
Payer: OTHER MISCELLANEOUS

## 2025-03-12 VITALS — HEIGHT: 67 IN | WEIGHT: 199 LBS | BODY MASS INDEX: 31.23 KG/M2

## 2025-03-12 DIAGNOSIS — Z98.1 ARTHRODESIS STATUS: ICD-10-CM

## 2025-03-12 DIAGNOSIS — Z86.2 PERSONAL HISTORY OF DISEASES OF THE BLOOD AND BLOOD-FORMING ORGANS AND CERTAIN DISORDERS INVOLVING THE IMMUNE MECHANISM: ICD-10-CM

## 2025-03-12 PROCEDURE — 99213 OFFICE O/P EST LOW 20 MIN: CPT

## 2025-03-19 ENCOUNTER — APPOINTMENT (OUTPATIENT)
Dept: ORTHOPEDIC SURGERY | Facility: CLINIC | Age: 60
End: 2025-03-19
Payer: OTHER MISCELLANEOUS

## 2025-03-19 DIAGNOSIS — M75.42 IMPINGEMENT SYNDROME OF LEFT SHOULDER: ICD-10-CM

## 2025-03-19 DIAGNOSIS — M25.512 PAIN IN LEFT SHOULDER: ICD-10-CM

## 2025-03-19 PROCEDURE — 73030 X-RAY EXAM OF SHOULDER: CPT | Mod: LT

## 2025-03-19 PROCEDURE — 73010 X-RAY EXAM OF SHOULDER BLADE: CPT | Mod: LT

## 2025-03-19 PROCEDURE — 20611 DRAIN/INJ JOINT/BURSA W/US: CPT | Mod: LT

## 2025-03-19 PROCEDURE — 99204 OFFICE O/P NEW MOD 45 MIN: CPT | Mod: 25

## 2025-04-02 ENCOUNTER — APPOINTMENT (OUTPATIENT)
Dept: ORTHOPEDIC SURGERY | Facility: CLINIC | Age: 60
End: 2025-04-02

## 2025-06-03 ENCOUNTER — APPOINTMENT (OUTPATIENT)
Dept: NEUROSURGERY | Facility: CLINIC | Age: 60
End: 2025-06-03

## 2025-06-11 ENCOUNTER — APPOINTMENT (OUTPATIENT)
Dept: ORTHOPEDIC SURGERY | Facility: CLINIC | Age: 60
End: 2025-06-11

## 2025-06-11 VITALS — HEIGHT: 67 IN | WEIGHT: 199 LBS | BODY MASS INDEX: 31.23 KG/M2

## 2025-06-11 PROCEDURE — 72040 X-RAY EXAM NECK SPINE 2-3 VW: CPT

## 2025-06-11 PROCEDURE — 99455 WORK RELATED DISABILITY EXAM: CPT

## (undated) DEVICE — HALTER HEAD

## (undated) DEVICE — DRAPE XL SHEET 77X98"

## (undated) DEVICE — MIDAS REX LEGEND MATCH HEAD FLUTED SM BORE 3.0MM X 10CM

## (undated) DEVICE — TAPE SILK 3"

## (undated) DEVICE — BAR SPRD 9IN

## (undated) DEVICE — STAPLER SKIN PROXIMATE

## (undated) DEVICE — FOLEY TRAY 16FR LF URINE METER SURESTEP

## (undated) DEVICE — GLV 7.5 PROTEXIS (WHITE)

## (undated) DEVICE — VENODYNE/SCD SLEEVE CALF MEDIUM

## (undated) DEVICE — BIPOLAR FORCEP SYMMETRY BAYONET 7" X 1.5MM SMOOTH (SILVER)

## (undated) DEVICE — DRSG TEGADERM 4X4.75"

## (undated) DEVICE — TUBING FOR SMOKE EVACUATOR (PURPLE END)

## (undated) DEVICE — POSITIONER FOAM LAMINECTOMY ARM CRADLE (PINK)

## (undated) DEVICE — DRAPE INSTRUMENT POUCH 6.75" X 11"

## (undated) DEVICE — NDL SPINAL 18G X 3.5" (PINK)

## (undated) DEVICE — ELCTR GROUNDING PAD ADULT COVIDIEN

## (undated) DEVICE — DRAIN JACKSON PRATT 3 SPRING RESERVOIR W 7FR PVC DRAIN

## (undated) DEVICE — WARMING BLANKET LOWER ADULT

## (undated) DEVICE — FRAZIER CONNECTING TUBE 2FT 5MM

## (undated) DEVICE — PACK NEURO

## (undated) DEVICE — MIDAS REX MR8 MATCH HEAD FLUTED LG BORE 3MM X 14CM

## (undated) DEVICE — DRAPE U LONG 47X70"

## (undated) DEVICE — DRAPE C ARM UNIVERSAL

## (undated) DEVICE — GLV 8.5 PROTEXIS (WHITE)

## (undated) DEVICE — DRAPE TOWEL BLUE 17" X 24"

## (undated) DEVICE — DRSG MASTISOL

## (undated) DEVICE — DRAPE C ARM C-ARMOUR

## (undated) DEVICE — SUT VICRYL 2-0 18" CP-2 UNDYED (POP-OFF)

## (undated) DEVICE — DRAPE 3/4 SHEET WITH ADHESIVE 76" X 60"

## (undated) DEVICE — PREP DURAPREP 26CC

## (undated) DEVICE — ELCTR ROCKER SWITCH PENCIL BLUE 10FT

## (undated) DEVICE — DRAPE SPLIT SHEET 77" X 108"

## (undated) DEVICE — POSITIONER FOAM EGG CRATE ULNAR 2PCS (PINK)

## (undated) DEVICE — SUT MONOCRYL 3-0 27" PS-2 UNDYED

## (undated) DEVICE — DRSG STERISTRIPS 0.5 X 4"

## (undated) DEVICE — SPONGE PEANUT AUTO COUNT

## (undated) DEVICE — SOL IRR POUR NS 0.9% 1000ML

## (undated) DEVICE — DRAPE 1/2 SHEET 40X57"

## (undated) DEVICE — Device

## (undated) DEVICE — MARKING PEN W RULER

## (undated) DEVICE — SOL IRR POUR H2O 1000ML